# Patient Record
Sex: MALE | Race: WHITE | NOT HISPANIC OR LATINO | URBAN - METROPOLITAN AREA
[De-identification: names, ages, dates, MRNs, and addresses within clinical notes are randomized per-mention and may not be internally consistent; named-entity substitution may affect disease eponyms.]

---

## 2019-10-28 VITALS
OXYGEN SATURATION: 90 % | HEART RATE: 67 BPM | HEART RATE: 74 BPM | OXYGEN SATURATION: 81 % | OXYGEN SATURATION: 89 % | HEIGHT: 67 IN | OXYGEN SATURATION: 94 % | HEART RATE: 64 BPM | RESPIRATION RATE: 14 BRPM | RESPIRATION RATE: 15 BRPM | OXYGEN SATURATION: 96 % | RESPIRATION RATE: 6 BRPM | DIASTOLIC BLOOD PRESSURE: 73 MMHG | DIASTOLIC BLOOD PRESSURE: 86 MMHG | SYSTOLIC BLOOD PRESSURE: 116 MMHG | RESPIRATION RATE: 17 BRPM | HEART RATE: 79 BPM | OXYGEN SATURATION: 88 % | SYSTOLIC BLOOD PRESSURE: 140 MMHG | SYSTOLIC BLOOD PRESSURE: 133 MMHG | TEMPERATURE: 97 F | TEMPERATURE: 96.5 F | RESPIRATION RATE: 13 BRPM | HEART RATE: 75 BPM | SYSTOLIC BLOOD PRESSURE: 139 MMHG | OXYGEN SATURATION: 97 % | DIASTOLIC BLOOD PRESSURE: 70 MMHG | WEIGHT: 240 LBS | DIASTOLIC BLOOD PRESSURE: 67 MMHG | SYSTOLIC BLOOD PRESSURE: 115 MMHG | RESPIRATION RATE: 11 BRPM | RESPIRATION RATE: 18 BRPM | HEART RATE: 73 BPM | HEART RATE: 81 BPM | DIASTOLIC BLOOD PRESSURE: 90 MMHG | RESPIRATION RATE: 12 BRPM | SYSTOLIC BLOOD PRESSURE: 129 MMHG | SYSTOLIC BLOOD PRESSURE: 124 MMHG | DIASTOLIC BLOOD PRESSURE: 65 MMHG | SYSTOLIC BLOOD PRESSURE: 136 MMHG | HEART RATE: 78 BPM | SYSTOLIC BLOOD PRESSURE: 123 MMHG | DIASTOLIC BLOOD PRESSURE: 59 MMHG | OXYGEN SATURATION: 87 % | SYSTOLIC BLOOD PRESSURE: 155 MMHG | OXYGEN SATURATION: 95 % | HEART RATE: 77 BPM

## 2019-10-30 ENCOUNTER — AMBULATORY SURGICAL CENTER (AMBULATORY)
Dept: URBAN - METROPOLITAN AREA SURGERY 17 | Facility: SURGERY | Age: 69
End: 2019-10-30
Payer: COMMERCIAL

## 2019-10-30 ENCOUNTER — OFFICE (AMBULATORY)
Dept: URBAN - METROPOLITAN AREA PATHOLOGY 4 | Facility: PATHOLOGY | Age: 69
End: 2019-10-30
Payer: COMMERCIAL

## 2019-10-30 DIAGNOSIS — K57.30 DIVERTICULOSIS OF LARGE INTESTINE WITHOUT PERFORATION OR ABS: ICD-10-CM

## 2019-10-30 DIAGNOSIS — D12.2 BENIGN NEOPLASM OF ASCENDING COLON: ICD-10-CM

## 2019-10-30 DIAGNOSIS — Z86.010 PERSONAL HISTORY OF COLONIC POLYPS: ICD-10-CM

## 2019-10-30 DIAGNOSIS — D12.3 BENIGN NEOPLASM OF TRANSVERSE COLON: ICD-10-CM

## 2019-10-30 DIAGNOSIS — K62.1 RECTAL POLYP: ICD-10-CM

## 2019-10-30 LAB
GI HISTOLOGY: A. UNSPECIFIED: (no result)
GI HISTOLOGY: B. UNSPECIFIED: (no result)
GI HISTOLOGY: C. UNSPECIFIED: (no result)
GI HISTOLOGY: PDF REPORT: (no result)

## 2019-10-30 PROCEDURE — 88305 TISSUE EXAM BY PATHOLOGIST: CPT | Mod: 33 | Performed by: INTERNAL MEDICINE

## 2019-10-30 PROCEDURE — 45385 COLONOSCOPY W/LESION REMOVAL: CPT | Mod: 33 | Performed by: INTERNAL MEDICINE

## 2019-10-30 NOTE — SERVICEHPINOTES
pleasant 69-year-old gentleman who has a personal history of polyps.  He is asymptomatic at this time from a GI standpoint.  Family history is negative for polyps or colon cancer.  He presents for a follow-up colonoscopy.

## 2025-03-31 VITALS
SYSTOLIC BLOOD PRESSURE: 104 MMHG | RESPIRATION RATE: 22 BRPM | RESPIRATION RATE: 14 BRPM | OXYGEN SATURATION: 93 % | HEART RATE: 79 BPM | SYSTOLIC BLOOD PRESSURE: 169 MMHG | DIASTOLIC BLOOD PRESSURE: 106 MMHG | OXYGEN SATURATION: 91 % | HEART RATE: 84 BPM | OXYGEN SATURATION: 96 % | TEMPERATURE: 97.4 F | SYSTOLIC BLOOD PRESSURE: 122 MMHG | SYSTOLIC BLOOD PRESSURE: 117 MMHG | HEART RATE: 75 BPM | DIASTOLIC BLOOD PRESSURE: 76 MMHG | HEART RATE: 70 BPM | SYSTOLIC BLOOD PRESSURE: 136 MMHG | WEIGHT: 220 LBS | HEART RATE: 72 BPM | RESPIRATION RATE: 12 BRPM | DIASTOLIC BLOOD PRESSURE: 77 MMHG | HEART RATE: 69 BPM | SYSTOLIC BLOOD PRESSURE: 144 MMHG | SYSTOLIC BLOOD PRESSURE: 124 MMHG | DIASTOLIC BLOOD PRESSURE: 70 MMHG | OXYGEN SATURATION: 99 % | RESPIRATION RATE: 18 BRPM | DIASTOLIC BLOOD PRESSURE: 86 MMHG | HEART RATE: 71 BPM | DIASTOLIC BLOOD PRESSURE: 89 MMHG | OXYGEN SATURATION: 95 % | HEART RATE: 67 BPM | SYSTOLIC BLOOD PRESSURE: 139 MMHG | TEMPERATURE: 96.4 F | OXYGEN SATURATION: 98 % | RESPIRATION RATE: 21 BRPM | RESPIRATION RATE: 16 BRPM | DIASTOLIC BLOOD PRESSURE: 69 MMHG | RESPIRATION RATE: 11 BRPM | SYSTOLIC BLOOD PRESSURE: 171 MMHG | SYSTOLIC BLOOD PRESSURE: 126 MMHG | HEIGHT: 67 IN

## 2025-04-03 ENCOUNTER — OFFICE (AMBULATORY)
Dept: URBAN - METROPOLITAN AREA PATHOLOGY 4 | Facility: PATHOLOGY | Age: 75
End: 2025-04-03
Payer: COMMERCIAL

## 2025-04-03 ENCOUNTER — AMBULATORY SURGICAL CENTER (AMBULATORY)
Dept: URBAN - METROPOLITAN AREA SURGERY 17 | Facility: SURGERY | Age: 75
End: 2025-04-03
Payer: COMMERCIAL

## 2025-04-03 DIAGNOSIS — K63.5 POLYP OF COLON: ICD-10-CM

## 2025-04-03 DIAGNOSIS — D12.4 BENIGN NEOPLASM OF DESCENDING COLON: ICD-10-CM

## 2025-04-03 DIAGNOSIS — Z86.0101 PERSONAL HISTORY OF ADENOMATOUS AND SERRATED COLON POLYPS: ICD-10-CM

## 2025-04-03 DIAGNOSIS — Z09 ENCOUNTER FOR FOLLOW-UP EXAMINATION AFTER COMPLETED TREATMEN: ICD-10-CM

## 2025-04-03 DIAGNOSIS — K57.30 DIVERTICULOSIS OF LARGE INTESTINE WITHOUT PERFORATION OR ABS: ICD-10-CM

## 2025-04-03 LAB
GI HISTOLOGY: A. TRANSVERSE COLON: (no result)
GI HISTOLOGY: B. DESCENDING COLON: (no result)
GI HISTOLOGY: PDF REPORT: (no result)

## 2025-04-03 PROCEDURE — 45385 COLONOSCOPY W/LESION REMOVAL: CPT | Mod: 33 | Performed by: INTERNAL MEDICINE

## 2025-04-03 PROCEDURE — 88305 TISSUE EXAM BY PATHOLOGIST: CPT | Performed by: PATHOLOGY

## 2025-04-03 NOTE — SERVICEHPINOTES
pleasant 75-year-old gentleman who has a personal history of polyps.  He is asymptomatic at this time from a GI standpoint.  Family history is negative for polyps or colon cancer.  He presents for a follow-up colonoscopy.

## 2025-07-14 ENCOUNTER — APPOINTMENT (OUTPATIENT)
Dept: CT IMAGING | Facility: HOSPITAL | Age: 75
End: 2025-07-14
Payer: COMMERCIAL

## 2025-07-14 ENCOUNTER — HOSPITAL ENCOUNTER (INPATIENT)
Facility: HOSPITAL | Age: 75
LOS: 2 days | Discharge: HOME | End: 2025-07-16
Attending: EMERGENCY MEDICINE | Admitting: STUDENT IN AN ORGANIZED HEALTH CARE EDUCATION/TRAINING PROGRAM
Payer: COMMERCIAL

## 2025-07-14 ENCOUNTER — APPOINTMENT (OUTPATIENT)
Dept: GENERAL RADIOLOGY | Facility: HOSPITAL | Age: 75
End: 2025-07-14
Payer: COMMERCIAL

## 2025-07-14 DIAGNOSIS — R20.0 RIGHT SIDED NUMBNESS: ICD-10-CM

## 2025-07-14 DIAGNOSIS — I77.810 AORTIC ROOT DILATION: ICD-10-CM

## 2025-07-14 DIAGNOSIS — R29.90 STROKE-LIKE SYMPTOM: Primary | ICD-10-CM

## 2025-07-14 DIAGNOSIS — I48.0 PAROXYSMAL ATRIAL FIBRILLATION: ICD-10-CM

## 2025-07-14 DIAGNOSIS — I16.9 HYPERTENSIVE CRISIS: ICD-10-CM

## 2025-07-14 PROBLEM — I10 HYPERTENSION: Status: ACTIVE | Noted: 2025-07-14

## 2025-07-14 PROBLEM — E78.00 HYPERCHOLESTEROLEMIA: Status: ACTIVE | Noted: 2025-07-14

## 2025-07-14 LAB
ABO GROUP BLD: NORMAL
ALBUMIN SERPL-MCNC: 4.2 G/DL (ref 3.5–5.2)
ALBUMIN/GLOB SERPL: 1.4 G/DL
ALP SERPL-CCNC: 100 U/L (ref 39–117)
ALT SERPL W P-5'-P-CCNC: 23 U/L (ref 1–41)
ANION GAP SERPL CALCULATED.3IONS-SCNC: 11.8 MMOL/L (ref 5–15)
APTT PPP: 26.3 SECONDS (ref 22.7–35.4)
AST SERPL-CCNC: 21 U/L (ref 1–40)
BASOPHILS # BLD AUTO: 0.02 10*3/MM3 (ref 0–0.2)
BASOPHILS NFR BLD AUTO: 0.3 % (ref 0–1.5)
BILIRUB SERPL-MCNC: 0.3 MG/DL (ref 0–1.2)
BLD GP AB SCN SERPL QL: NEGATIVE
BUN SERPL-MCNC: 10 MG/DL (ref 8–23)
BUN/CREAT SERPL: 12.5 (ref 7–25)
CALCIUM SPEC-SCNC: 9.5 MG/DL (ref 8.6–10.5)
CHLORIDE SERPL-SCNC: 101 MMOL/L (ref 98–107)
CO2 SERPL-SCNC: 20.2 MMOL/L (ref 22–29)
CREAT SERPL-MCNC: 0.8 MG/DL (ref 0.76–1.27)
DEPRECATED RDW RBC AUTO: 46.7 FL (ref 37–54)
EGFRCR SERPLBLD CKD-EPI 2021: 92.3 ML/MIN/1.73
EOSINOPHIL # BLD AUTO: 0.11 10*3/MM3 (ref 0–0.4)
EOSINOPHIL NFR BLD AUTO: 1.4 % (ref 0.3–6.2)
ERYTHROCYTE [DISTWIDTH] IN BLOOD BY AUTOMATED COUNT: 13.2 % (ref 12.3–15.4)
GLOBULIN UR ELPH-MCNC: 3.1 GM/DL
GLUCOSE SERPL-MCNC: 106 MG/DL (ref 65–99)
HCT VFR BLD AUTO: 50.4 % (ref 37.5–51)
HGB BLD-MCNC: 15.8 G/DL (ref 13–17.7)
HOLD SPECIMEN: NORMAL
HOLD SPECIMEN: NORMAL
IMM GRANULOCYTES # BLD AUTO: 0.05 10*3/MM3 (ref 0–0.05)
IMM GRANULOCYTES NFR BLD AUTO: 0.6 % (ref 0–0.5)
INR PPP: 1.11 (ref 0.9–1.1)
LYMPHOCYTES # BLD AUTO: 2.11 10*3/MM3 (ref 0.7–3.1)
LYMPHOCYTES NFR BLD AUTO: 27.3 % (ref 19.6–45.3)
MCH RBC QN AUTO: 30 PG (ref 26.6–33)
MCHC RBC AUTO-ENTMCNC: 31.3 G/DL (ref 31.5–35.7)
MCV RBC AUTO: 95.8 FL (ref 79–97)
MONOCYTES # BLD AUTO: 0.5 10*3/MM3 (ref 0.1–0.9)
MONOCYTES NFR BLD AUTO: 6.5 % (ref 5–12)
NEUTROPHILS NFR BLD AUTO: 4.94 10*3/MM3 (ref 1.7–7)
NEUTROPHILS NFR BLD AUTO: 63.9 % (ref 42.7–76)
NRBC BLD AUTO-RTO: 0 /100 WBC (ref 0–0.2)
PLATELET # BLD AUTO: 233 10*3/MM3 (ref 140–450)
PMV BLD AUTO: 9.9 FL (ref 6–12)
POTASSIUM SERPL-SCNC: 3.3 MMOL/L (ref 3.5–5.2)
PROT SERPL-MCNC: 7.3 G/DL (ref 6–8.5)
PROTHROMBIN TIME: 14.2 SECONDS (ref 11.7–14.2)
RBC # BLD AUTO: 5.26 10*6/MM3 (ref 4.14–5.8)
RH BLD: POSITIVE
SODIUM SERPL-SCNC: 133 MMOL/L (ref 136–145)
T&S EXPIRATION DATE: NORMAL
WBC NRBC COR # BLD AUTO: 7.73 10*3/MM3 (ref 3.4–10.8)
WHOLE BLOOD HOLD COAG: NORMAL
WHOLE BLOOD HOLD SPECIMEN: NORMAL

## 2025-07-14 PROCEDURE — 80053 COMPREHEN METABOLIC PANEL: CPT | Performed by: PHYSICIAN ASSISTANT

## 2025-07-14 PROCEDURE — 25510000001 IOPAMIDOL PER 1 ML: Performed by: EMERGENCY MEDICINE

## 2025-07-14 PROCEDURE — 99285 EMERGENCY DEPT VISIT HI MDM: CPT

## 2025-07-14 PROCEDURE — 93005 ELECTROCARDIOGRAM TRACING: CPT | Performed by: PHYSICIAN ASSISTANT

## 2025-07-14 PROCEDURE — 25010000002 LABETALOL 5 MG/ML SOLUTION: Performed by: PHYSICIAN ASSISTANT

## 2025-07-14 PROCEDURE — 70498 CT ANGIOGRAPHY NECK: CPT

## 2025-07-14 PROCEDURE — 85610 PROTHROMBIN TIME: CPT | Performed by: PHYSICIAN ASSISTANT

## 2025-07-14 PROCEDURE — 86850 RBC ANTIBODY SCREEN: CPT | Performed by: PHYSICIAN ASSISTANT

## 2025-07-14 PROCEDURE — 93010 ELECTROCARDIOGRAM REPORT: CPT | Performed by: INTERNAL MEDICINE

## 2025-07-14 PROCEDURE — 86900 BLOOD TYPING SEROLOGIC ABO: CPT | Performed by: PHYSICIAN ASSISTANT

## 2025-07-14 PROCEDURE — 85730 THROMBOPLASTIN TIME PARTIAL: CPT | Performed by: PHYSICIAN ASSISTANT

## 2025-07-14 PROCEDURE — 71045 X-RAY EXAM CHEST 1 VIEW: CPT

## 2025-07-14 PROCEDURE — 25010000002 LABETALOL 5 MG/ML SOLUTION: Performed by: EMERGENCY MEDICINE

## 2025-07-14 PROCEDURE — 70496 CT ANGIOGRAPHY HEAD: CPT

## 2025-07-14 PROCEDURE — 86901 BLOOD TYPING SEROLOGIC RH(D): CPT | Performed by: PHYSICIAN ASSISTANT

## 2025-07-14 PROCEDURE — 85025 COMPLETE CBC W/AUTO DIFF WBC: CPT | Performed by: PHYSICIAN ASSISTANT

## 2025-07-14 RX ORDER — ONDANSETRON 4 MG/1
4 TABLET, ORALLY DISINTEGRATING ORAL EVERY 6 HOURS PRN
Status: DISCONTINUED | OUTPATIENT
Start: 2025-07-14 | End: 2025-07-16 | Stop reason: HOSPADM

## 2025-07-14 RX ORDER — ONDANSETRON 2 MG/ML
4 INJECTION INTRAMUSCULAR; INTRAVENOUS EVERY 6 HOURS PRN
Status: DISCONTINUED | OUTPATIENT
Start: 2025-07-14 | End: 2025-07-16 | Stop reason: HOSPADM

## 2025-07-14 RX ORDER — LABETALOL HYDROCHLORIDE 5 MG/ML
10 INJECTION, SOLUTION INTRAVENOUS ONCE
Status: COMPLETED | OUTPATIENT
Start: 2025-07-14 | End: 2025-07-14

## 2025-07-14 RX ORDER — ASPIRIN 300 MG/1
300 SUPPOSITORY RECTAL DAILY
Status: DISCONTINUED | OUTPATIENT
Start: 2025-07-15 | End: 2025-07-15

## 2025-07-14 RX ORDER — IOPAMIDOL 755 MG/ML
100 INJECTION, SOLUTION INTRAVASCULAR
Status: COMPLETED | OUTPATIENT
Start: 2025-07-14 | End: 2025-07-14

## 2025-07-14 RX ORDER — LABETALOL HYDROCHLORIDE 5 MG/ML
40 INJECTION, SOLUTION INTRAVENOUS ONCE
Status: DISCONTINUED | OUTPATIENT
Start: 2025-07-14 | End: 2025-07-15

## 2025-07-14 RX ORDER — ACETAMINOPHEN 650 MG/1
650 SUPPOSITORY RECTAL EVERY 4 HOURS PRN
Status: DISCONTINUED | OUTPATIENT
Start: 2025-07-14 | End: 2025-07-16 | Stop reason: HOSPADM

## 2025-07-14 RX ORDER — SODIUM CHLORIDE 0.9 % (FLUSH) 0.9 %
10 SYRINGE (ML) INJECTION AS NEEDED
Status: DISCONTINUED | OUTPATIENT
Start: 2025-07-14 | End: 2025-07-16 | Stop reason: HOSPADM

## 2025-07-14 RX ORDER — SODIUM CHLORIDE 9 MG/ML
75 INJECTION, SOLUTION INTRAVENOUS CONTINUOUS
Status: DISCONTINUED | OUTPATIENT
Start: 2025-07-14 | End: 2025-07-15

## 2025-07-14 RX ORDER — POLYETHYLENE GLYCOL 3350 17 G/17G
17 POWDER, FOR SOLUTION ORAL DAILY PRN
Status: DISCONTINUED | OUTPATIENT
Start: 2025-07-14 | End: 2025-07-16 | Stop reason: HOSPADM

## 2025-07-14 RX ORDER — ASPIRIN 325 MG
325 TABLET ORAL DAILY
Status: DISCONTINUED | OUTPATIENT
Start: 2025-07-15 | End: 2025-07-15

## 2025-07-14 RX ORDER — BISACODYL 10 MG
10 SUPPOSITORY, RECTAL RECTAL DAILY PRN
Status: DISCONTINUED | OUTPATIENT
Start: 2025-07-14 | End: 2025-07-16 | Stop reason: HOSPADM

## 2025-07-14 RX ORDER — LABETALOL HYDROCHLORIDE 5 MG/ML
10 INJECTION, SOLUTION INTRAVENOUS
Status: DISCONTINUED | OUTPATIENT
Start: 2025-07-14 | End: 2025-07-15

## 2025-07-14 RX ORDER — LABETALOL HYDROCHLORIDE 5 MG/ML
20 INJECTION, SOLUTION INTRAVENOUS ONCE
Status: COMPLETED | OUTPATIENT
Start: 2025-07-14 | End: 2025-07-14

## 2025-07-14 RX ORDER — ATORVASTATIN CALCIUM 80 MG/1
80 TABLET, FILM COATED ORAL NIGHTLY
Status: DISCONTINUED | OUTPATIENT
Start: 2025-07-14 | End: 2025-07-16 | Stop reason: HOSPADM

## 2025-07-14 RX ORDER — NITROGLYCERIN 0.4 MG/1
0.4 TABLET SUBLINGUAL
Status: DISCONTINUED | OUTPATIENT
Start: 2025-07-14 | End: 2025-07-16 | Stop reason: HOSPADM

## 2025-07-14 RX ORDER — AMOXICILLIN 250 MG
2 CAPSULE ORAL 2 TIMES DAILY PRN
Status: DISCONTINUED | OUTPATIENT
Start: 2025-07-14 | End: 2025-07-16 | Stop reason: HOSPADM

## 2025-07-14 RX ORDER — BISACODYL 5 MG/1
5 TABLET, DELAYED RELEASE ORAL DAILY PRN
Status: DISCONTINUED | OUTPATIENT
Start: 2025-07-14 | End: 2025-07-16 | Stop reason: HOSPADM

## 2025-07-14 RX ORDER — ACETAMINOPHEN 325 MG/1
650 TABLET ORAL EVERY 4 HOURS PRN
Status: DISCONTINUED | OUTPATIENT
Start: 2025-07-14 | End: 2025-07-16 | Stop reason: HOSPADM

## 2025-07-14 RX ADMIN — LABETALOL HYDROCHLORIDE 20 MG: 5 INJECTION, SOLUTION INTRAVENOUS at 22:14

## 2025-07-14 RX ADMIN — LABETALOL HYDROCHLORIDE 10 MG: 5 INJECTION, SOLUTION INTRAVENOUS at 21:06

## 2025-07-14 RX ADMIN — IOPAMIDOL 95 ML: 755 INJECTION, SOLUTION INTRAVENOUS at 20:47

## 2025-07-15 ENCOUNTER — APPOINTMENT (OUTPATIENT)
Dept: CARDIOLOGY | Facility: HOSPITAL | Age: 75
End: 2025-07-15
Payer: COMMERCIAL

## 2025-07-15 ENCOUNTER — APPOINTMENT (OUTPATIENT)
Dept: MRI IMAGING | Facility: HOSPITAL | Age: 75
End: 2025-07-15
Payer: COMMERCIAL

## 2025-07-15 PROBLEM — E87.6 HYPOKALEMIA: Status: ACTIVE | Noted: 2025-07-15

## 2025-07-15 LAB
ANION GAP SERPL CALCULATED.3IONS-SCNC: 12.7 MMOL/L (ref 5–15)
BUN SERPL-MCNC: 8 MG/DL (ref 8–23)
BUN/CREAT SERPL: 12.9 (ref 7–25)
CALCIUM SPEC-SCNC: 9.5 MG/DL (ref 8.6–10.5)
CHLORIDE SERPL-SCNC: 106 MMOL/L (ref 98–107)
CHOLEST SERPL-MCNC: 154 MG/DL (ref 0–200)
CO2 SERPL-SCNC: 18.3 MMOL/L (ref 22–29)
CREAT SERPL-MCNC: 0.62 MG/DL (ref 0.76–1.27)
DEPRECATED RDW RBC AUTO: 42.1 FL (ref 37–54)
EGFRCR SERPLBLD CKD-EPI 2021: 99.7 ML/MIN/1.73
ERYTHROCYTE [DISTWIDTH] IN BLOOD BY AUTOMATED COUNT: 12.4 % (ref 12.3–15.4)
GLUCOSE BLDC GLUCOMTR-MCNC: 101 MG/DL (ref 70–130)
GLUCOSE BLDC GLUCOMTR-MCNC: 99 MG/DL (ref 70–130)
GLUCOSE SERPL-MCNC: 99 MG/DL (ref 65–99)
HBA1C MFR BLD: 5.2 % (ref 4.8–5.6)
HCT VFR BLD AUTO: 47.3 % (ref 37.5–51)
HDLC SERPL-MCNC: 50 MG/DL (ref 40–60)
HGB BLD-MCNC: 15.6 G/DL (ref 13–17.7)
LDLC SERPL CALC-MCNC: 81 MG/DL (ref 0–100)
LDLC/HDLC SERPL: 1.56 {RATIO}
MCH RBC QN AUTO: 30.5 PG (ref 26.6–33)
MCHC RBC AUTO-ENTMCNC: 33 G/DL (ref 31.5–35.7)
MCV RBC AUTO: 92.4 FL (ref 79–97)
PLATELET # BLD AUTO: 254 10*3/MM3 (ref 140–450)
PMV BLD AUTO: 10.2 FL (ref 6–12)
POTASSIUM SERPL-SCNC: 3.6 MMOL/L (ref 3.5–5.2)
QT INTERVAL: 412 MS
QTC INTERVAL: 459 MS
RBC # BLD AUTO: 5.12 10*6/MM3 (ref 4.14–5.8)
SODIUM SERPL-SCNC: 137 MMOL/L (ref 136–145)
TRIGL SERPL-MCNC: 130 MG/DL (ref 0–150)
TSH SERPL DL<=0.05 MIU/L-ACNC: 2.06 UIU/ML (ref 0.27–4.2)
VLDLC SERPL-MCNC: 23 MG/DL (ref 5–40)
WBC NRBC COR # BLD AUTO: 9.33 10*3/MM3 (ref 3.4–10.8)

## 2025-07-15 PROCEDURE — 70551 MRI BRAIN STEM W/O DYE: CPT

## 2025-07-15 PROCEDURE — 25010000002 LABETALOL 5 MG/ML SOLUTION: Performed by: STUDENT IN AN ORGANIZED HEALTH CARE EDUCATION/TRAINING PROGRAM

## 2025-07-15 PROCEDURE — 84443 ASSAY THYROID STIM HORMONE: CPT

## 2025-07-15 PROCEDURE — 93306 TTE W/DOPPLER COMPLETE: CPT | Performed by: INTERNAL MEDICINE

## 2025-07-15 PROCEDURE — 82948 REAGENT STRIP/BLOOD GLUCOSE: CPT

## 2025-07-15 PROCEDURE — 97161 PT EVAL LOW COMPLEX 20 MIN: CPT

## 2025-07-15 PROCEDURE — 97165 OT EVAL LOW COMPLEX 30 MIN: CPT

## 2025-07-15 PROCEDURE — 85027 COMPLETE CBC AUTOMATED: CPT

## 2025-07-15 PROCEDURE — 99222 1ST HOSP IP/OBS MODERATE 55: CPT | Performed by: STUDENT IN AN ORGANIZED HEALTH CARE EDUCATION/TRAINING PROGRAM

## 2025-07-15 PROCEDURE — 80061 LIPID PANEL: CPT

## 2025-07-15 PROCEDURE — 92523 SPEECH SOUND LANG COMPREHEN: CPT

## 2025-07-15 PROCEDURE — 80048 BASIC METABOLIC PNL TOTAL CA: CPT

## 2025-07-15 PROCEDURE — 83036 HEMOGLOBIN GLYCOSYLATED A1C: CPT

## 2025-07-15 PROCEDURE — 25810000003 SODIUM CHLORIDE 0.9 % SOLUTION

## 2025-07-15 PROCEDURE — 93306 TTE W/DOPPLER COMPLETE: CPT

## 2025-07-15 PROCEDURE — 36415 COLL VENOUS BLD VENIPUNCTURE: CPT

## 2025-07-15 PROCEDURE — 97116 GAIT TRAINING THERAPY: CPT

## 2025-07-15 PROCEDURE — 25510000001 PERFLUTREN 6.52 MG/ML SUSPENSION 2 ML VIAL

## 2025-07-15 RX ORDER — AMLODIPINE BESYLATE 2.5 MG/1
2.5 TABLET ORAL DAILY
Status: DISCONTINUED | OUTPATIENT
Start: 2025-07-15 | End: 2025-07-16

## 2025-07-15 RX ORDER — ALLOPURINOL 100 MG/1
100 TABLET ORAL DAILY
Status: DISCONTINUED | OUTPATIENT
Start: 2025-07-15 | End: 2025-07-16 | Stop reason: HOSPADM

## 2025-07-15 RX ORDER — LABETALOL HYDROCHLORIDE 5 MG/ML
10 INJECTION, SOLUTION INTRAVENOUS EVERY 6 HOURS PRN
Status: DISCONTINUED | OUTPATIENT
Start: 2025-07-15 | End: 2025-07-16 | Stop reason: HOSPADM

## 2025-07-15 RX ORDER — CLOPIDOGREL BISULFATE 75 MG/1
75 TABLET ORAL DAILY
Status: DISCONTINUED | OUTPATIENT
Start: 2025-07-16 | End: 2025-07-16

## 2025-07-15 RX ORDER — CLOPIDOGREL BISULFATE 75 MG/1
300 TABLET ORAL ONCE
Status: COMPLETED | OUTPATIENT
Start: 2025-07-15 | End: 2025-07-15

## 2025-07-15 RX ORDER — LISINOPRIL 20 MG/1
20 TABLET ORAL DAILY
COMMUNITY

## 2025-07-15 RX ORDER — ATORVASTATIN CALCIUM 10 MG/1
10 TABLET, FILM COATED ORAL NIGHTLY
COMMUNITY
End: 2025-07-16 | Stop reason: HOSPADM

## 2025-07-15 RX ORDER — ASPIRIN 81 MG/1
81 TABLET, CHEWABLE ORAL DAILY
Status: DISCONTINUED | OUTPATIENT
Start: 2025-07-16 | End: 2025-07-16 | Stop reason: HOSPADM

## 2025-07-15 RX ORDER — ALLOPURINOL 100 MG/1
100 TABLET ORAL DAILY
COMMUNITY

## 2025-07-15 RX ORDER — LISINOPRIL 20 MG/1
20 TABLET ORAL DAILY
Status: DISCONTINUED | OUTPATIENT
Start: 2025-07-15 | End: 2025-07-15

## 2025-07-15 RX ORDER — LISINOPRIL 20 MG/1
20 TABLET ORAL DAILY
Status: DISCONTINUED | OUTPATIENT
Start: 2025-07-15 | End: 2025-07-16 | Stop reason: HOSPADM

## 2025-07-15 RX ORDER — LABETALOL HYDROCHLORIDE 5 MG/ML
10 INJECTION, SOLUTION INTRAVENOUS
Status: DISCONTINUED | OUTPATIENT
Start: 2025-07-15 | End: 2025-07-15

## 2025-07-15 RX ADMIN — ATORVASTATIN CALCIUM 80 MG: 80 TABLET, FILM COATED ORAL at 00:50

## 2025-07-15 RX ADMIN — ATORVASTATIN CALCIUM 80 MG: 80 TABLET, FILM COATED ORAL at 20:45

## 2025-07-15 RX ADMIN — ASPIRIN 325 MG ORAL TABLET 325 MG: 325 PILL ORAL at 08:50

## 2025-07-15 RX ADMIN — LABETALOL HYDROCHLORIDE 10 MG: 5 INJECTION, SOLUTION INTRAVENOUS at 18:40

## 2025-07-15 RX ADMIN — ALLOPURINOL 100 MG: 100 TABLET ORAL at 08:50

## 2025-07-15 RX ADMIN — AMLODIPINE BESYLATE 2.5 MG: 2.5 TABLET ORAL at 20:45

## 2025-07-15 RX ADMIN — SODIUM CHLORIDE 75 ML/HR: 9 INJECTION, SOLUTION INTRAVENOUS at 00:50

## 2025-07-15 RX ADMIN — CLOPIDOGREL BISULFATE 300 MG: 75 TABLET ORAL at 08:50

## 2025-07-15 RX ADMIN — PERFLUTREN 2 ML: 6.52 INJECTION, SUSPENSION INTRAVENOUS at 13:30

## 2025-07-15 RX ADMIN — LISINOPRIL 20 MG: 20 TABLET ORAL at 15:14

## 2025-07-15 RX ADMIN — LABETALOL HYDROCHLORIDE 10 MG: 5 INJECTION, SOLUTION INTRAVENOUS at 20:48

## 2025-07-15 NOTE — PLAN OF CARE
Problem: Adult Inpatient Plan of Care  Goal: Plan of Care Review  7/15/2025 1835 by Jose Alejandro Rocha RN  Outcome: Progressing  Flowsheets (Taken 7/15/2025 1835)  Progress: improving  Plan of Care Reviewed With: patient  7/15/2025 1834 by Jose Alejandro Rocha RN  Outcome: Progressing  Flowsheets (Taken 7/15/2025 1834)  Progress: improving  Plan of Care Reviewed With: patient  Goal: Patient-Specific Goal (Individualized)  7/15/2025 1835 by Jose Alejandro Rocha RN  Outcome: Progressing  7/15/2025 1834 by Jose Alejandro Rocha RN  Outcome: Progressing  Goal: Absence of Hospital-Acquired Illness or Injury  7/15/2025 1835 by Jose Alejandro Rocha RN  Outcome: Progressing  7/15/2025 1834 by Jose Alejandro Rocha RN  Outcome: Progressing  Intervention: Identify and Manage Fall Risk  Recent Flowsheet Documentation  Taken 7/15/2025 1600 by Jose Alejandro Rocha RN  Safety Promotion/Fall Prevention:   safety round/check completed   assistive device/personal items within reach  Taken 7/15/2025 1400 by Jose Alejandro Rocha RN  Safety Promotion/Fall Prevention: patient off unit  Taken 7/15/2025 1200 by Jose Alejandro Rocha RN  Safety Promotion/Fall Prevention:   safety round/check completed   assistive device/personal items within reach  Taken 7/15/2025 0801 by Jose Alejandro Rocha RN  Safety Promotion/Fall Prevention:   safety round/check completed   assistive device/personal items within reach  Intervention: Prevent Infection  Recent Flowsheet Documentation  Taken 7/15/2025 1600 by Jose Alejandro Rocha RN  Infection Prevention:   personal protective equipment utilized   environmental surveillance performed  Taken 7/15/2025 1200 by Jose Alejandro Rocha RN  Infection Prevention:   personal protective equipment utilized   environmental surveillance performed  Taken 7/15/2025 0801 by Jose Alejandro Rocha RN  Infection Prevention:   personal protective equipment utilized   environmental surveillance performed  Goal:  Optimal Comfort and Wellbeing  7/15/2025 1835 by Jose Alejandro Rocha RN  Outcome: Progressing  7/15/2025 1834 by Jose Alejandro Rocha RN  Outcome: Progressing  Goal: Readiness for Transition of Care  7/15/2025 1835 by Jose Alejandro Rocha RN  Outcome: Progressing  7/15/2025 1834 by Jose Alejandro Rocha RN  Outcome: Progressing   Goal Outcome Evaluation:  Plan of Care Reviewed With: patient        Progress: improving

## 2025-07-15 NOTE — ED PROVIDER NOTES
EMERGENCY DEPARTMENT ENCOUNTER      Room Number:  04/04  PCP: Daniel Billy MD  Independent Historians: Historian: Patient  Patient Care Team:  Daniel Billy MD as PCP - General (Family Medicine)       HPI:  Chief Complaint: Right sided numbness    A complete HPI/ROS/PMH/PSH/SH/FH are unobtainable due to: EM_Unobtainable History : None    Chronic or social conditions impacting patient care (Social Determinants of Health): None      Context: Tushar Herron is a 75 y.o. male with a PMH significant for hypertension, hyperlipidemia, vitamin D deficiency who presents to the ED c/o acute right-sided numbness.  The patient reports that at 7:10 PM he was in bed and started noticing that his right arm was falling asleep.  He progressed with numbness of the right arm as well as development of right-sided facial tingling and right leg tingling.  It was at this time that he called EMS for transport.  EMS arrived to find the patient with the described symptoms as well as some weakness of the right foot with dorsiflexion and weakness of the right hand.  Throughout transport the patient did have repetitive questioning but arrives with significant improvement of symptoms.  He is now at his baseline with no symptoms currently.  No history of similar symptoms.  No blood thinner use.  No chest pain or shortness of breath today.      Upon review of prior external notes (non-ED) -and- Review of prior external test results outside of this encounter it appears the patient was evaluated in the office with PCP for gout on 3/25/2024.  The patient had a normal troponin and BNP on 7/22/2024.      PAST MEDICAL HISTORY  Active Ambulatory Problems     Diagnosis Date Noted    No Active Ambulatory Problems     Resolved Ambulatory Problems     Diagnosis Date Noted    No Resolved Ambulatory Problems     No Additional Past Medical History         PAST SURGICAL HISTORY  No past surgical history on file.      FAMILY HISTORY  No family history on  file.      SOCIAL HISTORY  Social History     Socioeconomic History    Marital status: Single         ALLERGIES  Penicillins      REVIEW OF SYSTEMS  Included in HPI  All systems reviewed and negative except for those discussed in HPI.      PHYSICAL EXAM    I have reviewed the triage vital signs and nursing notes.    ED Triage Vitals [07/14/25 2023]   Temp Heart Rate Resp BP SpO2   -- 78 19 (!) 217/106 96 %      Temp src Heart Rate Source Patient Position BP Location FiO2 (%)   -- Monitor -- -- --       Physical Exam  Constitutional:       General: He is not in acute distress.     Appearance: He is well-developed.   HENT:      Head: Normocephalic and atraumatic.   Eyes:      General: No scleral icterus.     Conjunctiva/sclera: Conjunctivae normal.   Neck:      Trachea: No tracheal deviation.   Cardiovascular:      Rate and Rhythm: Normal rate and regular rhythm.   Pulmonary:      Effort: Pulmonary effort is normal.      Breath sounds: Normal breath sounds.   Abdominal:      Palpations: Abdomen is soft.      Tenderness: There is no abdominal tenderness. There is no guarding.   Musculoskeletal:         General: No deformity.      Cervical back: Normal range of motion.   Lymphadenopathy:      Cervical: No cervical adenopathy.   Skin:     General: Skin is warm and dry.   Neurological:      Mental Status: He is alert and oriented to person, place, and time.      Comments: Cranial nerves II-XII are intact.  Sensation is intact and symmetric throughout, no deficit.  Motor function is 5/5 and symmetric in the extremities x 4.  There is no facial droop, aphasia, dysarthria, speech is clear and coherent.  Normal FTN.   Psychiatric:         Behavior: Behavior normal.         Vital signs and nursing notes reviewed.      PPE: I wore a surgical mask throughout my encounters with the pt. I performed hand hygiene on entry into the pt room and upon exit.     LAB RESULTS  Recent Results (from the past 24 hours)   ECG 12 Lead Stroke  Evaluation    Collection Time: 07/14/25  8:27 PM   Result Value Ref Range    QT Interval 412 ms    QTC Interval 459 ms   Comprehensive Metabolic Panel    Collection Time: 07/14/25  8:33 PM    Specimen: Blood   Result Value Ref Range    Glucose 106 (H) 65 - 99 mg/dL    BUN 10.0 8.0 - 23.0 mg/dL    Creatinine 0.80 0.76 - 1.27 mg/dL    Sodium 133 (L) 136 - 145 mmol/L    Potassium 3.3 (L) 3.5 - 5.2 mmol/L    Chloride 101 98 - 107 mmol/L    CO2 20.2 (L) 22.0 - 29.0 mmol/L    Calcium 9.5 8.6 - 10.5 mg/dL    Total Protein 7.3 6.0 - 8.5 g/dL    Albumin 4.2 3.5 - 5.2 g/dL    ALT (SGPT) 23 1 - 41 U/L    AST (SGOT) 21 1 - 40 U/L    Alkaline Phosphatase 100 39 - 117 U/L    Total Bilirubin 0.3 0.0 - 1.2 mg/dL    Globulin 3.1 gm/dL    A/G Ratio 1.4 g/dL    BUN/Creatinine Ratio 12.5 7.0 - 25.0    Anion Gap 11.8 5.0 - 15.0 mmol/L    eGFR 92.3 >60.0 mL/min/1.73   Protime-INR    Collection Time: 07/14/25  8:33 PM    Specimen: Blood   Result Value Ref Range    Protime 14.2 11.7 - 14.2 Seconds    INR 1.11 (H) 0.90 - 1.10   aPTT    Collection Time: 07/14/25  8:33 PM    Specimen: Blood   Result Value Ref Range    PTT 26.3 22.7 - 35.4 seconds   Type & Screen    Collection Time: 07/14/25  8:33 PM    Specimen: Blood   Result Value Ref Range    ABO Type A     RH type Positive     Antibody Screen Negative     T&S Expiration Date 7/17/2025 11:59:59 PM    Green Top (Gel)    Collection Time: 07/14/25  8:33 PM   Result Value Ref Range    Extra Tube Hold for add-ons.    Lavender Top    Collection Time: 07/14/25  8:33 PM   Result Value Ref Range    Extra Tube hold for add-on    Gold Top - SST    Collection Time: 07/14/25  8:33 PM   Result Value Ref Range    Extra Tube Hold for add-ons.    Light Blue Top    Collection Time: 07/14/25  8:33 PM   Result Value Ref Range    Extra Tube Hold for add-ons.    CBC Auto Differential    Collection Time: 07/14/25  8:33 PM    Specimen: Blood   Result Value Ref Range    WBC 7.73 3.40 - 10.80 10*3/mm3    RBC 5.26  4.14 - 5.80 10*6/mm3    Hemoglobin 15.8 13.0 - 17.7 g/dL    Hematocrit 50.4 37.5 - 51.0 %    MCV 95.8 79.0 - 97.0 fL    MCH 30.0 26.6 - 33.0 pg    MCHC 31.3 (L) 31.5 - 35.7 g/dL    RDW 13.2 12.3 - 15.4 %    RDW-SD 46.7 37.0 - 54.0 fl    MPV 9.9 6.0 - 12.0 fL    Platelets 233 140 - 450 10*3/mm3    Neutrophil % 63.9 42.7 - 76.0 %    Lymphocyte % 27.3 19.6 - 45.3 %    Monocyte % 6.5 5.0 - 12.0 %    Eosinophil % 1.4 0.3 - 6.2 %    Basophil % 0.3 0.0 - 1.5 %    Immature Grans % 0.6 (H) 0.0 - 0.5 %    Neutrophils, Absolute 4.94 1.70 - 7.00 10*3/mm3    Lymphocytes, Absolute 2.11 0.70 - 3.10 10*3/mm3    Monocytes, Absolute 0.50 0.10 - 0.90 10*3/mm3    Eosinophils, Absolute 0.11 0.00 - 0.40 10*3/mm3    Basophils, Absolute 0.02 0.00 - 0.20 10*3/mm3    Immature Grans, Absolute 0.05 0.00 - 0.05 10*3/mm3    nRBC 0.0 0.0 - 0.2 /100 WBC         RADIOLOGY  CT Angiogram Head w AI Analysis of LVO  CT Angiogram Head w AI Analysis of LVO, CT Angiogram Neck  Result Date: 7/14/2025  HEAD CT WITHOUT CONTRAST, HEAD & NECK CTA WITH CONTRAST  INDICATION: Right-sided weakness.  TECHNIQUE: Axial images were acquired from the skull base to vertex without contrast, including multiplanar reformats, per standard departmental protocol , followed by CT angiogram of the head and neck with IV contrast. 3-D postprocessing was performed and reviewed.  Evaluation for a significant carotid arterial stenosis is based on the NASCET criteria.  Radiation dose reduction techniques were utilized, including automated exposure control, and exposure modulation based on body size.  COMPARISON: None available.  FINDINGS:  Head CT: There is no CT evidence of acute intracranial hemorrhage, mass, or infarct. There is volume loss, but there is no evidence of hydrocephalus or extra-axial fluid collection.  There are nonspecific white matter changes, but there is no acute intracranial abnormality.  CTA neck: There is a normal aortic arch branching pattern without proximal  great vessel stenosis. Both vertebral arteries are patent throughout the neck, and supply the basilar artery.  Both common carotid arteries are normally patent. There is plaque at both cervical carotid bifurcations, with 0% stenosis in both internal carotid arteries.  CTA head:  There is symmetric distal intracranial runoff in the anterior, middle, and posterior cerebral artery territories.  There is no evidence of intracranial aneurysm, or of high-grade intracranial flow-limiting stenosis.  There is no evidence of branch vessel occlusion, or region or zone of hypoperfusion.  The dural venous sinuses appear normal.  Extravascular structures: There is no intracranial mass or abnormal enhancement.   The extracranial and cervical soft tissue structures show no acute abnormality.  The visualized lung apices show no acute abnormality.  There is no acute bony abnormality.       Negative unenhanced head CT, no evidence of acute intracranial abnormality. Mild senescent change.  There is plaque at both cervical carotid bifurcations, but 0% stenosis in both internal carotid arteries. Both vertebral arteries are patent throughout the neck.  Normal head CT angiogram, no acute intracranial vascular abnormality.  This report was finalized on 7/14/2025 9:30 PM by Dr. Antonio Fuentes M.D on Workstation: [id number]      XR Chest 1 View  Result Date: 7/14/2025  AP CHEST  HISTORY: Acute stroke protocol  COMPARISON: None available  FINDINGS: Lung fields are clear. Heart size within normal limits. No evidence of pneumothorax. No acute bony abnormality      No active disease    This report was finalized on 7/14/2025 9:11 PM by Dr. Adrian Hinojosa M.D on Workstation: [id number]          MEDICATIONS GIVEN IN ER  Medications   sodium chloride 0.9 % flush 10 mL (has no administration in time range)   labetalol (NORMODYNE,TRANDATE) injection 40 mg (0 mg Intravenous Hold 7/14/25 0718)   iopamidol (ISOVUE-370) 76 % injection 100 mL (95 mL  Intravenous Given by Other 7/14/25 2047)   labetalol (NORMODYNE,TRANDATE) injection 10 mg (10 mg Intravenous Given 7/14/25 2106)   labetalol (NORMODYNE,TRANDATE) injection 20 mg (20 mg Intravenous Given 7/14/25 2214)         ORDERS PLACED DURING THIS VISIT:  Orders Placed This Encounter   Procedures    CT Angiogram Head w AI Analysis of LVO    CT Angiogram Neck    XR Chest 1 View    [place] Draw    Comprehensive Metabolic Panel    Protime-INR    aPTT    CBC Auto Differential    NPO Diet NPO Type: Strict NPO    Perform NIH Stroke Scale    Measure Actual Weight    Notify Provider    Notify Provider for SBP Greater Than 140 for Hemorrhagic Stroke Patient    Head of Bed 30 Degrees or Less    Undress and Gown    Continuous Pulse Oximetry    Vital Signs    Neuro Checks    No Hypotonic Fluids    Nursing Dysphagia Screening (Complete Prior to Giving anything PO)    RN to Place Order SLP Consult (IF swallow screen failed) - Eval & Treat Choosing Reason of RN Dysphagia Screen Failed    [facility] (on-call MD unless specified) Details    Oxygen Therapy- Nasal Cannula; Titrate 1-6 LPM Per SpO2; 90 - 95%    POC Glucose Once    ECG 12 Lead Stroke Evaluation    Type & Screen    Insert Large-Bore Peripheral IV - RIGHT AC Preferred    Inpatient Admission    CBC & Differential    Green Top (Gel)    Lavender Top    Gold Top - SST    Light Blue Top         OUTPATIENT MEDICATION MANAGEMENT:  Current Facility-Administered Medications Ordered in Epic   Medication Dose Route Frequency Provider Last Rate Last Admin    labetalol (NORMODYNE,TRANDATE) injection 40 mg  40 mg Intravenous Once Tushar Boyd PA        sodium chloride 0.9 % flush 10 mL  10 mL Intravenous PRN Tushar Boyd PA         No current [facility]-ordered outpatient medications on file.             PROGRESS, DATA ANALYSIS, CONSULTS, AND MEDICAL DECISION MAKING  All labs have been independently interpreted by me.  All radiology studies have been reviewed by me. All EKG's have been  independently viewed and interpreted by me.  Discussion below represents my analysis of pertinent findings related to patient's condition, differential diagnosis, treatment plan and final disposition.    Patient presentation and evaluation most consistent with strokelike episode with poorly controlled hypertension requiring admission for neurology consult and further supportive care.  Patient agreeable with all questions answered.    DIFFERENTIAL DIAGNOSIS INCLUDE BUT NOT LIMITED TO:     Stroke, TIA, hypertensive emergency    Clinical Scores: N/A      ED Course as of 07/14/25 2331   Mon Jul 14, 2025 2027 I discussed the case with MD Sammie with Stroke Neurology at this time regarding the patient.  I discussed work-up, results, concerns.  I discussed the consulting provider's desire for obtaining stat CTA head and neck with subsequent admission to the observation unit for further stroke evaluation.  Notification for TNK or mechanical intervention at this time as the patient has no symptoms currently.   [DC]   2044 Hemoglobin: 15.8 [TR]   2044 EKG PROCEDURE    EKG time: 2027  Rhythm/Rate: Normal sinus, rate 75  P waves and CT: Normal P, normal CT  QRS, axis: Narrow QRS, normal axis  ST and T waves: No acute    Independently Interpreted by me  No prior EKG available for comparison   [TR]   2119 INR(!): 1.11 [TR]   2119 Potassium(!): 3.3 [TR]   2119 PTT: 26.3 [TR]   2119 BP(!): 219/104 [TR]   2119 XR Chest 1 View  My independent interpretation of the imaging study is no dense consolidation [TR]   2317 BP(!): 184/93 [DC]   2329 I discussed the case with CHILANGO Quiñones with [facility] at this time regarding the patient.  I discussed work-up, results, concerns.  I discussed the consulting provider's desire for tele admit to MD Heydi.   [DC]      ED Course User Index  [DC] Tushar Boyd PA  [TR] Jeff Benitez MD         2313 I rechecked the patient.  I discussed the patient's labs, radiology findings (including all  incidental findings), diagnosis, and plan for admission. The patient understands and agrees with the plan.      AS OF 23:31 EDT VITALS:    BP - (!) 184/93  HR - 63  TEMP - 97.8 °F (36.6 °C) (Oral)  O2 SATS - 97%    COMPLEXITY OF CARE  The patient requires admission.      DIAGNOSIS  Final diagnoses:   Stroke-like symptom   Right sided numbness   Hypertensive crisis         DISPOSITION  ED Disposition       ED Disposition   Decision to Admit    Condition   --    Comment   Level of Care: Med/Surg [1]   Diagnosis: Stroke-like symptoms [140640]   Admitting Physician: MARY REDDY [[id number]]   Attending Physician: MARY REDDY [[id number]]   Certification: I Certify That Inpatient Hospital Services Are Medically Necessary For Greater Than 2 Midnights                  Please note that portions of this document were completed with a voice recognition program.    Note Disclaimer: At [facility], we believe that sharing information builds trust and better relationships. You are receiving this note because you recently visited [facility]. It is possible you will see health information before a provider has talked with you about it. This kind of information can be easy to misunderstand. To help you fully understand what it means for your health, we urge you to discuss this note with your provider.         Tushar Boyd PA  07/15/25 4705

## 2025-07-15 NOTE — THERAPY EVALUATION
Patient Name: Tushar Herron  : 1950    MRN: [id number]                              Today's Date: 7/15/2025       Admit Date: 2025    Visit Dx:     ICD-10-CM ICD-9-CM   1. Stroke-like symptom  R29.90 781.99   2. Right sided numbness  R20.0 782.0   3. Hypertensive crisis  I16.9 401.9     Patient Active Problem List   Diagnosis    Stroke-like symptoms    Hypertension    Hypercholesterolemia     Past Medical History:   Diagnosis Date    Elevated cholesterol     Gout     Hypertension     Prostate cancer      History reviewed. No pertinent surgical history.   General Information       Row Name 07/15/25 1009          Physical Therapy Time and Intention    Document Type evaluation  -     Mode of Treatment physical therapy  -       Row Name 07/15/25 1009          General Information    Patient Profile Reviewed yes  -     Prior Level of Function independent:;gait;transfer;bed mobility  -     Existing Precautions/Restrictions fall  -     Barriers to Rehab none identified  -       Row Name 07/15/25 1009          Living Environment    Current Living Arrangements apartment  -     People in Home alone  -       Row Name 07/15/25 1009          Stairs Within Home, Primary    Stairs, Within Home, Primary 2nd floor apartment - reports 4 sets of 7 LINDA  -       Row Name 07/15/25 1009          Cognition    Orientation Status (Cognition) oriented x 4  -[facility] Name 07/15/25 1009          Safety Issues/Impairments Affecting Functional Mobility    Impairments Affecting Function (Mobility) balance  -               User Key  (r) = Recorded By, (t) = Taken By, (c) = Cosigned By      Initials Name Provider Type     Dianne Gil PT Physical Therapist                   Mobility       Row Name 07/15/25 1010          Bed Mobility    Bed Mobility supine-sit;sit-supine  -     Supine-Sit [place] (Bed Mobility) independent  -     Sit-Supine [place] (Bed Mobility) independent  -       Row Name  07/15/25 1010          Sit-Stand Transfer    Sit-Stand [place] (Transfers) supervision  -[facility] Name 07/15/25 1010          Gait/Stairs (Locomotion)    [place] Level (Gait) supervision  -     Assistive Device (Gait) other (see comments)  No AD  -     Distance in Feet (Gait) 120  -     Comment, (Gait/Stairs) Mild unsteadiness especially with turns but able to self-correct  -               User Key  (r) = Recorded By, (t) = Taken By, (c) = Cosigned By      Initials Name Provider Type     Dianne Gil PT Physical Therapist                   Obj/Interventions       [facility] Name 07/15/25 1010          Range of Motion Comprehensive    General Range of Motion bilateral lower extremity ROM WFL  -BH       Row Name 07/15/25 1010          Strength Comprehensive (MMT)    General Manual Muscle Testing (MMT) Assessment lower extremity strength deficits identified  -     Comment, General Manual Muscle Testing (MMT) Assessment Generalized weakness, BLE grossly 4/5 - no unilateral deficits noted  -BH       Row Name 07/15/25 1010          Balance    Balance Assessment sitting static balance;sitting dynamic balance;standing static balance;standing dynamic balance  -     Static Sitting Balance independent  -     Dynamic Sitting Balance independent  -     Position, Sitting Balance unsupported;sitting edge of bed  -     Static Standing Balance supervision  -     Dynamic Standing Balance supervision  -     Position/Device Used, Standing Balance unsupported  -     Balance Interventions sitting;standing;sit to stand;static;dynamic  -BH       Row Name 07/15/25 1010          Sensory Assessment (Somatosensory)    Sensory Assessment (Somatosensory) LE sensation intact  -               User Key  (r) = Recorded By, (t) = Taken By, (c) = Cosigned By      Initials Name Provider Type     Dianne Gil PT Physical Therapist                   Goals/Plan    No documentation.                  Clinical  Impression       Row Name 07/15/25 1011          Pain    Pretreatment Pain Rating 0/10 - no pain  -     Posttreatment Pain Rating 0/10 - no pain  -BH       Row Name 07/15/25 1011          Plan of Care Review    Plan of Care Reviewed With patient  -     Outcome Evaluation Pt is a 76 yo M admitted from home with R sided weakness and numbness - MRI completed this AM, awaiting neuro consult though pt reports resolution of symptoms. Pt lives alone in a 2nd floor apartment and reports independence at BL with no AD. Pt reports 4 sets of 7 LINDA, no difficulty at BL. Pt presents to PT with minimal functional mobility deficits, reports feeling at his BL. Pt transferred to EOB independently and stood with SV for safety. Ambulated to bathroom and then 120ft in hallway with no AD and SV for safety. Pt mildly unsteady with turns but no overt LOB - likely d/t minimal mobility, encouraged continuing to call out for assist up to the bathroom and progressing gait distance in hallway as able. Pt returned to room and back to bed, left with needs met. Pt safe to DC home with family assist as needed. PT will sign-off.  -BH       Row Name 07/15/25 1011          Therapy Assessment/Plan (PT)    Criteria for Skilled Interventions Met (PT) no;no problems identified which require skilled intervention  -     Therapy Frequency (PT) evaluation only  -BH       Row Name 07/15/25 1011          Vital Signs    O2 Delivery Pre Treatment room air  -     O2 Delivery Intra Treatment room air  -     O2 Delivery Post Treatment room air  [facility] 07/15/25 1011          Positioning and Restraints    Pre-Treatment Position in bed  -     Post Treatment Position bed  -     In Bed fowlers;call light within reach;encouraged to call for assist;exit alarm on  -               User Key  (r) = Recorded By, (t) = Taken By, (c) = Cosigned By      Initials Name Provider Type     Dianne Gil, PT Physical Therapist                   Outcome  Measures       Row Name 07/15/25 1016 07/15/25 0216       How much help from another person do you currently need...    Turning from your back to your side while in flat bed without using bedrails? 4  - 4  -BM    Moving from lying on back to sitting on the side of a flat bed without bedrails? 4  - 4  -BM    Moving to and from a bed to a chair (including a wheelchair)? 4  - 4  -BM    Standing up from a chair using your arms (e.g., wheelchair, bedside chair)? 4  - 4  -BM    Climbing 3-5 steps with a railing? 4  - 4  -BM    To walk in hospital room? 4  - 4  -BM    AM-PAC 6 Clicks Score (PT) 24  - 24  -BM    Highest Level of Mobility Goal Walk 250 Feet or More - 8  - Walk 250 Feet or More - 8  -BM      Row Name 07/15/25 1016          Functional Assessment    Outcome Measure Options AM-PAC 6 Clicks Basic Mobility (PT)  -               User Key  (r) = Recorded By, (t) = Taken By, (c) = Cosigned By      Initials Name Provider Type     Dianne Gil, PT Physical Therapist    Colette Maynard, RN Registered Nurse                                 Physical Therapy Education       Title: PT OT SLP Therapies (In Progress)       Topic: Physical Therapy (In Progress)       Point: Mobility training (Done)       Learning Progress Summary            Patient Acceptance, E,TB,D, VU,NR by  at 7/15/2025 1016                      Point: Home exercise program (Not Started)       Learner Progress:  Not documented in this visit.              Point: Body mechanics (Done)       Learning Progress Summary            Patient Acceptance, E,TB,D, VU,NR by  at 7/15/2025 1016                      Point: Precautions (Done)       Learning Progress Summary            Patient Acceptance, E,TB,D, VU,NR by  at 7/15/2025 1016                                      User Key       Initials Effective Dates Name Provider Type Discipline     04/08/22 -  Dianne Gil PT Physical Therapist PT                  PT  Recommendation and Plan     Outcome Evaluation: Pt is a 74 yo M admitted from home with R sided weakness and numbness - MRI completed this AM, awaiting neuro consult though pt reports resolution of symptoms. Pt lives alone in a 2nd floor apartment and reports independence at BL with no AD. Pt reports 4 sets of 7 LINDA, no difficulty at BL. Pt presents to PT with minimal functional mobility deficits, reports feeling at his BL. Pt transferred to EOB independently and stood with SV for safety. Ambulated to bathroom and then 120ft in hallway with no AD and SV for safety. Pt mildly unsteady with turns but no overt LOB - likely d/t minimal mobility, encouraged continuing to call out for assist up to the bathroom and progressing gait distance in hallway as able. Pt returned to room and back to bed, left with needs met. Pt safe to DC home with family assist as needed. PT will sign-off.     Time Calculation:         PT Charges       Row Name 07/15/25 1017             Time Calculation    Start Time 0947  -      Stop Time 0958  -      Time Calculation (min) 11 min  -      PT Received On 07/15/25  -         Time Calculation- PT    Total Timed Code Minutes- PT 9 minute(s)  -         Timed Charges    21538 - Gait Training Minutes  9  -BH         Total Minutes    Timed Charges Total Minutes 9  -BH       Total Minutes 9  -                User Key  (r) = Recorded By, (t) = Taken By, (c) = Cosigned By      Initials Name Provider Type     Dianne Gil, PT Physical Therapist                  Therapy Charges for Today       Code Description Service Date Service Provider Modifiers Qty    34922747877 HC GAIT TRAINING EA 15 MIN 7/15/2025 Dianne Gil, PT GP 1    35636023492 HC PT EVAL LOW COMPLEXITY 2 7/15/2025 Dianne Gil, PT GP 1            PT G-Codes  Outcome Measure Options: AM-PAC 6 Clicks Basic Mobility (PT)  AM-PAC 6 Clicks Score (PT): 24  PT Discharge Summary  Anticipated Discharge Disposition (PT): home  with assist    Dianne Gil, PT  7/15/2025

## 2025-07-15 NOTE — ED NOTES
Nursing report ED to floor  Tushar Herron  75 y.o.  male    HPI :  HPI  Stated Reason for Visit: pt arrived for right sided weakness. lkw 1910  History Obtained From: EMS    Chief Complaint  Chief Complaint   Patient presents with    Extremity Weakness       Admitting doctor:   Gokul Soliz MD    Admitting diagnosis:   The primary encounter diagnosis was Stroke-like symptom. Diagnoses of Right sided numbness and Hypertensive crisis were also pertinent to this visit.    Code status:   Current Code Status       Date Active Code Status Order ID Comments User Context       7/14/2025 2340 CPR (Attempt to Resuscitate) [id number]  Sussy Avila, [facility]        Question Answer    Code Status (Patient has no pulse and is not breathing) CPR (Attempt to Resuscitate)    Medical Interventions (Patient has pulse or is breathing) Full                    Allergies:   Penicillins    Isolation:   No active isolations    Intake and Output  No intake or output data in the 24 hours ending 07/15/25 0052    Weight:       07/14/25 2024   Weight: 99.7 kg (219 lb 12.8 oz)       Most recent vitals:   Vitals:    07/14/25 2214 07/14/25 2216 07/14/25 2314 07/15/25 0031   BP: (!) 223/108 (!) 215/99 (!) 184/93 152/79   Pulse: 65 65 63 67   Resp:    17   Temp:       TempSrc:       SpO2:  99% 97% 98%   Weight:       Height:           Active LDAs/IV Access:   Lines, Drains & Airways       Active LDAs       Name Placement date Placement time Site Days    Peripheral IV 07/14/25 2022 18 G Left Antecubital 07/14/25 2022  Antecubital  less than 1    Peripheral IV 07/14/25 2026 18 G Right Antecubital 07/14/25 2026  Antecubital  less than 1                    Labs (abnormal labs have a star):   Labs Reviewed   COMPREHENSIVE METABOLIC PANEL - Abnormal; Notable for the following components:       Result Value    Glucose 106 (*)     Sodium 133 (*)     Potassium 3.3 (*)     CO2 20.2 (*)     All other components within normal limits    Narrative:      GFR Categories in Chronic Kidney Disease (CKD)              GFR Category          GFR (mL/min/1.73)    Interpretation  G1                    90 or greater        Normal or high (1)  G2                    60-89                Mild decrease (1)  G3a                   45-59                Mild to moderate decrease  G3b                   30-44                Moderate to severe decrease  G4                    15-29                Severe decrease  G5                    14 or less           Kidney failure    (1)In the absence of evidence of kidney disease, neither GFR category G1 or G2 fulfill the criteria for CKD.    eGFR calculation 2021 CKD-EPI creatinine equation, which does not include race as a factor   PROTIME-INR - Abnormal; Notable for the following components:    INR 1.11 (*)     All other components within normal limits   CBC WITH AUTO DIFFERENTIAL - Abnormal; Notable for the following components:    MCHC 31.3 (*)     Immature Grans % 0.6 (*)     All other components within normal limits   APTT - Normal   RAINBOW DRAW    Narrative:     The following orders were created for panel order [place] Draw.  Procedure                               Abnormality         Status                     ---------                               -----------         ------                     Green Top (Gel)[[id number]]                                  Final result               Lavender Top[[id number]]                                     Final result               Gold Top - SST[[id number]]                                   Final result               Light Blue Top[[id number]]                                   Final result                 Please view results for these tests on the individual orders.   BASIC METABOLIC PANEL   CBC (NO DIFF)   HEMOGLOBIN A1C   LIPID PANEL   TSH   POCT GLUCOSE FINGERSTICK   POCT GLUCOSE FINGERSTICK   POCT GLUCOSE FINGERSTICK   POCT GLUCOSE FINGERSTICK   POCT GLUCOSE FINGERSTICK   POCT GLUCOSE FINGERSTICK    TYPE AND SCREEN   CBC AND DIFFERENTIAL    Narrative:     The following orders were created for panel order CBC & Differential.  Procedure                               Abnormality         Status                     ---------                               -----------         ------                     CBC Auto Differential[[id number]]        Abnormal            Final result                 Please view results for these tests on the individual orders.   GREEN TOP   LAVENDER TOP   GOLD TOP - SST   LIGHT BLUE TOP       EKG:   ECG 12 Lead Stroke Evaluation   Preliminary Result   HEART RATE=75  bpm   RR Hqagnozd=706  ms   CT Uvxgqvoq=732  ms   P Horizontal Axis=-22  deg   P Front Axis=32  deg   QRSD Cguqrbpr=941  ms   QT Owrppoou=037  ms   XYgL=401  ms   QRS Axis=25  deg   T Wave Axis=31  deg   - NORMAL ECG -   Sinus rhythm   When compared with ECG of 07-May-2013 14:30:34,   No significant change   Date and Time of Study:2025-07-14 20:27:15          Meds given in ED:   Medications   sodium chloride 0.9 % flush 10 mL (has no administration in time range)   labetalol (NORMODYNE,TRANDATE) injection 40 mg (0 mg Intravenous Hold 7/14/25 4236)   nitroglycerin (NITROSTAT) SL tablet 0.4 mg (has no administration in time range)   sennosides-docusate (PERICOLACE) 8.6-50 MG per tablet 2 tablet (has no administration in time range)     And   polyethylene glycol (MIRALAX) packet 17 g (has no administration in time range)     And   bisacodyl (DULCOLAX) EC tablet 5 mg (has no administration in time range)     And   bisacodyl (DULCOLAX) suppository 10 mg (has no administration in time range)   ondansetron ODT (ZOFRAN-ODT) disintegrating tablet 4 mg (has no administration in time range)     Or   ondansetron (ZOFRAN) injection 4 mg (has no administration in time range)   sodium chloride 0.9 % infusion (75 mL/hr Intravenous New Bag 7/15/25 0050)   acetaminophen (TYLENOL) tablet 650 mg (has no administration in time range)     Or    acetaminophen (TYLENOL) suppository 650 mg (has no administration in time range)   labetalol (NORMODYNE,TRANDATE) injection 10 mg (has no administration in time range)   aspirin tablet 325 mg (has no administration in time range)     Or   aspirin suppository 300 mg (has no administration in time range)   atorvastatin (LIPITOR) tablet 80 mg (80 mg Oral Given 7/15/25 0050)   iopamidol (ISOVUE-370) 76 % injection 100 mL (95 mL Intravenous Given by Other 7/14/25 2047)   labetalol (NORMODYNE,TRANDATE) injection 10 mg (10 mg Intravenous Given 7/14/25 2106)   labetalol (NORMODYNE,TRANDATE) injection 20 mg (20 mg Intravenous Given 7/14/25 2214)       Imaging results:  CT Angiogram Head w AI Analysis of LVO  Result Date: 7/14/2025   Negative unenhanced head CT, no evidence of acute intracranial abnormality. Mild senescent change.  There is plaque at both cervical carotid bifurcations, but 0% stenosis in both internal carotid arteries. Both vertebral arteries are patent throughout the neck.  Normal head CT angiogram, no acute intracranial vascular abnormality.  This report was finalized on 7/14/2025 9:30 PM by Dr. Antonio Fuentes M.D on Workstation: [id number]      CT Angiogram Neck  Result Date: 7/14/2025   Negative unenhanced head CT, no evidence of acute intracranial abnormality. Mild senescent change.  There is plaque at both cervical carotid bifurcations, but 0% stenosis in both internal carotid arteries. Both vertebral arteries are patent throughout the neck.  Normal head CT angiogram, no acute intracranial vascular abnormality.  This report was finalized on 7/14/2025 9:30 PM by Dr. Antonio Fuentes M.D on Workstation: [id number]      XR Chest 1 View  Result Date: 7/14/2025  No active disease    This report was finalized on 7/14/2025 9:11 PM by Dr. Adrian Hinojosa M.D on Workstation: [id number]        Ambulatory status:   -assist x1    Social issues:   Social History     Socioeconomic History    Marital  status: Single       Peripheral Neurovascular  Peripheral Neurovascular (Adult)  Peripheral Neurovascular WDL: WDL, capillary refill  Capillary Refill, General: less than/equal to 3 secs    Neuro Cognitive  Neuro Cognitive (Adult)  Cognitive/Neuro/Behavioral WDL: WDL  Level of Consciousness: Alert  Orientation: oriented x 4  Pupils  Pupil PERRLA: yes  [place] Coma Scale  Best Eye Response: 4-->(E4) spontaneous  Best Motor Response: 6-->(M6) obeys commands  Best Verbal Response: 5-->(V5) oriented  [place] Coma Scale Score: 15  NIH Stroke Scale  1a. Level of Consciousness: 0-->Alert, keenly responsive  1b. LOC Questions: 0-->Answers both questions correctly  1c. LOC Commands: 0-->Performs both tasks correctly  2. Best Gaze: 0-->Normal  3. Visual: 0-->No visual loss  4. Facial Palsy: 0-->Normal symmetrical movements  5a. Motor Arm, Left: 0-->No drift, limb holds 90 (or 45) degrees for full 10 secs  5b. Motor Arm, Right: 0-->No drift, limb holds 90 (or 45) degrees for full 10 secs  6a. Motor Leg, Left: 0-->No drift, leg holds 30 degree position for full 5 secs  6b. Motor Leg, Right: 0-->No drift, leg holds 30 degree position for full 5 secs  7. Limb Ataxia: 0-->Absent  8. Sensory: 0-->Normal, no sensory loss  9. Best Language: 0-->No aphasia, normal  10. Dysarthria: 0-->Normal  11. Extinction and Inattention (formerly Neglect): 0-->No abnormality  Total (NIH Stroke Scale): 0    Learning  Learning Assessment  Learning Readiness and Ability: no barriers identified  Education Provided  Person Taught: patient    Respiratory  Respiratory WDL  Respiratory WDL: WDL    Abdominal Pain  Abdominal Pain CPG Interventions  Coping Interventions: anticipatory guidance provided, care explained to patient/family prior to performing  Safety Interventions  Safety Precautions/Falls Reduction: assistive device/personal items within reach  All Alarms: alarm(s) activated and audible    Pain Assessments  Pain (Adult)  (0-10) Pain Rating: Rest:  0    NIH Stroke Scale  NIH Stroke Scale  1a. Level of Consciousness: 0-->Alert, keenly responsive  1b. LOC Questions: 0-->Answers both questions correctly  1c. LOC Commands: 0-->Performs both tasks correctly  2. Best Gaze: 0-->Normal  3. Visual: 0-->No visual loss  4. Facial Palsy: 0-->Normal symmetrical movements  5a. Motor Arm, Left: 0-->No drift, limb holds 90 (or 45) degrees for full 10 secs  5b. Motor Arm, Right: 0-->No drift, limb holds 90 (or 45) degrees for full 10 secs  6a. Motor Leg, Left: 0-->No drift, leg holds 30 degree position for full 5 secs  6b. Motor Leg, Right: 0-->No drift, leg holds 30 degree position for full 5 secs  7. Limb Ataxia: 0-->Absent  8. Sensory: 0-->Normal, no sensory loss  9. Best Language: 0-->No aphasia, normal  10. Dysarthria: 0-->Normal  11. Extinction and Inattention (formerly Neglect): 0-->No abnormality  Total (NIH Stroke Scale): 0    Edison Fernandes RN  07/15/25 00:52 EDT

## 2025-07-15 NOTE — PLAN OF CARE
Goal Outcome Evaluation:      NIH 0, BP slightly elevated. No complaints of pain, numbness, or tingling. MRI to be completed. Neuro consult placed. Ambulating SBA. Will continue to monitor.

## 2025-07-15 NOTE — THERAPY EVALUATION
Patient Name: Tushar Herron  : 1950    MRN: [id number]                              Today's Date: 7/15/2025       Admit Date: 2025    Visit Dx:     ICD-10-CM ICD-9-CM   1. Stroke-like symptom  R29.90 781.99   2. Right sided numbness  R20.0 782.0   3. Hypertensive crisis  I16.9 401.9     Patient Active Problem List   Diagnosis    Stroke-like symptoms    Hypertension    Hypercholesterolemia     Past Medical History:   Diagnosis Date    Elevated cholesterol     Gout     Hypertension     Prostate cancer      History reviewed. No pertinent surgical history.   General Information       [facility] Name 07/15/25 1127          OT Time and Intention    Subjective Information no complaints (P)   -TW     Document Type discharge evaluation/summary (P)   -TW     Mode of Treatment co-treatment;occupational therapy;physical therapy (P)   -TW     Patient Effort excellent (P)   -TW     Symptoms Noted During/After Treatment none (P)   -TW       [facility] Name 07/15/25 1127          General Information    Patient Profile Reviewed yes (P)   -TW     Prior Level of Function independent:;ADL's;gait;community mobility;all household mobility (P)   -TW     Existing Precautions/Restrictions fall (P)   -TW     Barriers to Rehab none identified (P)   -TW       [facility] Name 07/15/25 112          Living Environment    Current Living Arrangements apartment (P)   -TW     People in Home alone (P)   -TW       [facility] Name 07/15/25 1127          Home Main Entrance    Number of Stairs, Main Entrance -- (P)   ~28 stairs up to his front door  -TW     Stair Railings, Main Entrance railings safe and in good condition (P)   -TW       Row Name 07/15/25 1127          Stairs Within Home, Primary    Stairs, Within Home, Primary lives in a single story apartment on the second floor (P)   -TW     Number of Stairs, Within Home, Primary none (P)   -TW     Stair Railings, Within Home, Primary none (P)   -TW       [facility] Name 07/15/25 1127          Cognition    Orientation  Status (Cognition) oriented x 4 (P)   -TW       Row Name 07/15/25 1127          Safety Issues/Impairments Affecting Functional Mobility    Impairments Affecting Function (Mobility) balance (P)   -               User Key  (r) = Recorded By, (t) = Taken By, (c) = Cosigned By      Initials Name Provider Type    TW Henry Orellana OT Student OT Student                     Mobility/ADL's       [facility] 07/15/25 1128          Bed Mobility    Bed Mobility bed mobility (all) activities (P)   -     Supine-Sit [place] (Bed Mobility) standby assist (P)   -TW       Row Name 07/15/25 1128          Transfers    Transfers sit-stand transfer;stand-sit transfer (P)   -TW       Row Name 07/15/25 1128          Sit-Stand Transfer    Sit-Stand [place] (Transfers) standby assist (P)   -TW       Row Name 07/15/25 1128          Stand-Sit Transfer    Stand-Sit [place] (Transfers) standby assist (P)   -TW       Row Name 07/15/25 1128          Functional Mobility    Functional Mobility- Ind. Level standby assist (P)   -TW       Row Name 07/15/25 1128          Activities of Daily Living    BADL Assessment/Intervention lower body dressing;grooming;toileting (P)   -TW       Row Name 07/15/25 1128          Lower Body Dressing Assessment/Training    [place] Level (Lower Body Dressing) lower body dressing skills;doff;don;shoes/slippers;standby assist (P)   -     Position (Lower Body Dressing) edge of bed sitting (P)   -TW       Row Name 07/15/25 1128          Grooming Assessment/Training    [place] Level (Grooming) grooming skills;wash face, hands;standby assist (P)   -     Position (Grooming) unsupported standing (P)   -TW       Row Name 07/15/25 1128          Toileting Assessment/Training    [place] Level (Toileting) toileting skills;standby assist (P)   -     Position (Toileting) unsupported sitting (P)   -               User Key  (r) = Recorded By, (t) = Taken By, (c) = Cosigned By      Initials Name  Provider Type    TW Henry Orellana, OT Student OT Student                   Obj/Interventions       Row Name 07/15/25 1129          Sensory Assessment (Somatosensory)    Sensory Assessment Sensation WFL throughout all functional activities (P)   -TW       Row Name 07/15/25 1129          Vision Assessment/Intervention    Visual Impairment/Limitations WFL (P)   -TW       Row Name 07/15/25 1129          Range of Motion Comprehensive    General Range of Motion bilateral upper extremity ROM WFL (P)   -TW       Row Name 07/15/25 1129          Strength Comprehensive (MMT)    Comment, General Manual Muscle Testing (MMT) Assessment generalized weakness, BUE 4/5 (P)   -TW       Row Name 07/15/25 1129          Balance    Comment, Balance all seated and standing balance activities required SBA (P)   -               User Key  (r) = Recorded By, (t) = Taken By, (c) = Cosigned By      Initials Name Provider Type    TW Henry Orellana, OT Student OT Student                   Goals/Plan    No documentation.                  Clinical Impression       Row Name 07/15/25 1130          Pain Assessment    Pretreatment Pain Rating 0/10 - no pain (P)   -TW     Posttreatment Pain Rating 0/10 - no pain (P)   -TW       Row Name 07/15/25 1130          Plan of Care Review    Plan of Care Reviewed With patient (P)   -TW     Progress no change (P)   -TW     Outcome Evaluation The pt was admitted to [facility] 2/2 to right sided weakness and numbness, an MRI was completed this AM and are awaiting neuro to consult. The pt lives alone in an apartment on the second floor with ~28 steps up to his front door. He states that he is (I) in all ADLs and does not use any AD/AE at baseline. The pt was seen this AM for an OT/PT co-evaluation on this service date. The pt was supine upon arrival and was A&O x4. The pt stated he feels like he is back at his baseline for all ADL/self-care tasks. The pt participated in home simulation of bed mobility, grooming, toileting,  LBD, and household mobility all with SBA. The pt was encouraged to call for nsg for bathroom and mobilizing needs while admitted. Home with assist recommended at d/c. OT will sign off. (P)   -TW       Row Name 07/15/25 1130          Therapy Assessment/Plan (OT)    Criteria for Skilled Therapeutic Interventions Met (OT) no problems identified which require skilled intervention (P)   -TW     Therapy Frequency (OT) evaluation only (P)   -TW       Row Name 07/15/25 1130          Therapy Plan Review/Discharge Plan (OT)    Anticipated Discharge Disposition (OT) home with assist;home (P)   -TW       Row Name 07/15/25 1130          Vital Signs    O2 Delivery Pre Treatment room air (P)   -TW     O2 Delivery Intra Treatment room air (P)   -TW     O2 Delivery Post Treatment room air (P)   -TW     Pre Patient Position Supine (P)   -TW     Intra Patient Position Standing (P)   -TW     Post Patient Position Supine (P)   -TW       Row Name 07/15/25 1130          Positioning and Restraints    Pre-Treatment Position in bed (P)   -TW     Post Treatment Position bed (P)   -TW     In Bed notified nsg;supine;fowlers;call light within reach;encouraged to call for assist;exit alarm on (P)   -TW               User Key  (r) = Recorded By, (t) = Taken By, (c) = Cosigned By      Initials Name Provider Type    TW Henry Orellana OT Student OT Student                   Outcome Measures       Row Name 07/15/25 1131          How much help from another is currently needed...    Putting on and taking off regular lower body clothing? 4 (P)   -TW     Bathing (including washing, rinsing, and drying) 4 (P)   -TW     Toileting (which includes using toilet bed pan or urinal) 4 (P)   -TW     Putting on and taking off regular upper body clothing 4 (P)   -TW     Taking care of personal grooming (such as brushing teeth) 4 (P)   -TW     Eating meals 4 (P)   -TW     AM-PAC 6 Clicks Score (OT) 24 (P)   -TW       Row Name 07/15/25 1016 07/15/25 0840       How  much help from another person do you currently need...    Turning from your back to your side while in flat bed without using bedrails? 4  - 4  -SD (r) LM (t) SD (c)    Moving from lying on back to sitting on the side of a flat bed without bedrails? 4  - 4  -SD (r) LM (t) SD (c)    Moving to and from a bed to a chair (including a wheelchair)? 4  - 4  -SD (r) LM (t) SD (c)    Standing up from a chair using your arms (e.g., wheelchair, bedside chair)? 4  - 4  -SD (r) LM (t) SD (c)    Climbing 3-5 steps with a railing? 4  - 4  -SD (r) LM (t) SD (c)    To walk in hospital room? 4  - 4  -SD (r) LM (t) SD (c)    AM-PAC 6 Clicks Score (PT) 24  - 24  -SD (r) LM (t)    Highest Level of Mobility Goal Walk 250 Feet or More - 8  -BH Walk 250 Feet or More - 8  -SD (r) LM (t)      Row Name 07/15/25 0216          How much help from another person do you currently need...    Turning from your back to your side while in flat bed without using bedrails? 4  -BM     Moving from lying on back to sitting on the side of a flat bed without bedrails? 4  -BM     Moving to and from a bed to a chair (including a wheelchair)? 4  -BM     Standing up from a chair using your arms (e.g., wheelchair, bedside chair)? 4  -BM     Climbing 3-5 steps with a railing? 4  -BM     To walk in hospital room? 4  -BM     AM-PAC 6 Clicks Score (PT) 24  -BM     Highest Level of Mobility Goal Walk 250 Feet or More - 8  -BM       Row Name 07/15/25 1131          Modified Claudia Scale    Modified Dickson Scale 1 - No significant disability despite symptoms.  Able to carry out all usual duties and activities. (P)   -TW       Row Name 07/15/25 1131 07/15/25 1016       Functional Assessment    Outcome Measure Options AM-PAC 6 Clicks Daily Activity (OT);Modified Claudia (P)   -[facility]-PAC 6 Clicks Basic Mobility (PT)  -              User Key  (r) = Recorded By, (t) = Taken By, (c) = Cosigned By      Initials Name Provider Type    Jose Alejandro Davenport, RN  Registered Nurse    Dianne Sauceda, PT Physical Therapist    LM Ez Harkins, RN Extern Registered Nurse    BM Colette Roger, RN Registered Nurse    TW Henry Orellana, OT Student OT Student                    Occupational Therapy Education       Title: PT OT SLP Therapies (In Progress)       Topic: Occupational Therapy (Done)       Point: ADL training (Done)       Learning Progress Summary            Patient Acceptance, E,TB, VU by TW at 7/15/2025 1132    Comment: Role of OT, daily BUE exercises, OOB activity, use of call light to notify nsg of needs                      Point: Home exercise program (Done)       Learning Progress Summary            Patient Acceptance, E,TB, VU by TW at 7/15/2025 1132    Comment: Role of OT, daily BUE exercises, OOB activity, use of call light to notify nsg of needs                      Point: Precautions (Done)       Learning Progress Summary            Patient Acceptance, E,TB, VU by TW at 7/15/2025 1132    Comment: Role of OT, daily BUE exercises, OOB activity, use of call light to notify nsg of needs                      Point: Body mechanics (Done)       Learning Progress Summary            Patient Acceptance, E,TB, VU by TW at 7/15/2025 1132    Comment: Role of OT, daily BUE exercises, OOB activity, use of call light to notify nsg of needs                                      User Key       Initials Effective Dates Name Provider Type Discipline     04/30/25 -  Henry Orellana, NEREYDA Student OT Student OT                  OT Recommendation and Plan  Therapy Frequency (OT): (P) evaluation only  Plan of Care Review  Plan of Care Reviewed With: (P) patient  Progress: (P) no change  Outcome Evaluation: (P) The pt was admitted to [facility] 2/2 to right sided weakness and numbness, an MRI was completed this AM and are awaiting neuro to consult. The pt lives alone in an apartment on the second floor with ~28 steps up to his front door. He states that he is (I) in all ADLs and does  not use any AD/AE at baseline. The pt was seen this AM for an OT/PT co-evaluation on this service date. The pt was supine upon arrival and was A&O x4. The pt stated he feels like he is back at his baseline for all ADL/self-care tasks. The pt participated in home simulation of bed mobility, grooming, toileting, LBD, and household mobility all with SBA. The pt was encouraged to call for nsg for bathroom and mobilizing needs while admitted. Home with assist recommended at d/c. OT will sign off.     Time Calculation:   Evaluation Complexity (OT)  Review Occupational Profile/Medical/Therapy History Complexity: (P) brief/low complexity  Assessment, Occupational Performance/Identification of Deficit Complexity: (P) 1-3 performance deficits  Clinical Decision Making Complexity (OT): (P) problem focused assessment/low complexity  Overall Complexity of Evaluation (OT): (P) low complexity     Time Calculation- OT       Row Name 07/15/25 1133 07/15/25 1017          Time Calculation- OT    OT Start Time 0947 (P)   -TW --     OT Stop Time 0958 (P)   -TW --     OT Time Calculation (min) 11 min (P)   -TW --        Timed Charges    29134 - Gait Training Minutes  -- 9  -        Untimed Charges    OT Eval/Re-eval Minutes 11 (P)   -TW --        Total Minutes    Timed Charges Total Minutes -- 9  -     Untimed Charges Total Minutes 11 (P)   -TW --      Total Minutes 11 (P)   -TW 9  -BH               User Key  (r) = Recorded By, (t) = Taken By, (c) = Cosigned By      Initials Name Provider Type     Dianen Gil PT Physical Therapist    Henry Kong, OT Student OT Student                           Henry Orellana, OT Student  7/15/2025

## 2025-07-15 NOTE — PROGRESS NOTES
Discharge Planning Assessment  [facility]     Patient Name: Tushar Herron  MRN: [id number]  Today's Date: 7/15/2025    Admit Date: 7/14/2025    Plan: Return home - lives alone   Discharge Needs Assessment       Row Name 07/15/25 0947       Living Environment    People in Home alone    Current Living Arrangements apartment    Potentially Unsafe Housing Conditions none    Primary Care Provided by self    Provides Primary Care For no one    Family Caregiver if Needed sibling(s)    Family Caregiver Names Sister Porsche Nation [phone]    Quality of Family Relationships helpful    Able to Return to Prior Arrangements yes       Resource/Environmental Concerns    Resource/Environmental Concerns none    Transportation Concerns none       Transition Planning    Patient/Family Anticipates Transition to home    Patient/Family Anticipated Services at Transition none    Transportation Anticipated family or friend will provide       Discharge Needs Assessment    Readmission Within the Last 30 Days no previous admission in last 30 days    Equipment Currently Used at Home none    Concerns to be Addressed denies needs/concerns at this time    Anticipated Changes Related to Illness none    Equipment Needed After Discharge none                   Discharge Plan       Row Name 07/15/25 0948       Plan    Plan Return home - lives alone    Patient/Family in Agreement with Plan yes    Plan Comments Spoke with patient at bedside.  He lives alone in 2nd floor apartment with stairs and a handrail.  He is IADL, teaches at [employer], uses no DME, has never used HH or been to SNF.  PCP is Dr. Daniel Billy and pharmacy is [facility] [address]  Patient drives.  Sister Porsche Nation [phone] is emergency contact.  He plans to return home at [place].  CCP will follow.  Trevor FUNES                  Continued Care and Services - Admitted Since 7/14/2025    No active coordination exists.       Expected Discharge Date and Time       Expected Discharge  Date Expected Discharge Time    Jul 16, 2025            Demographic Summary       Row Name 07/15/25 0942       General Information    Admission Type inpatient    Arrived From home    Referral Source admission list    Reason for Consult discharge planning    Preferred Language English                   Functional Status       Row Name 07/15/25 0944       Functional Status    Usual Activity Tolerance good    Current Activity Tolerance good       Functional Status, IADL    Medications independent    Meal Preparation independent    Housekeeping independent    Laundry independent    Shopping independent       Mental Status    General Appearance WDL WDL       Mental Status Summary    Recent Changes in Mental Status/Cognitive Functioning no changes                               Becky S. [place], RN

## 2025-07-15 NOTE — THERAPY EVALUATION
Acute Care - Speech Language Pathology Initial Evaluation & Discharge Summary  [facility]     Patient Name: Tushar Herron  : 1950  MRN: [id number]  Today's Date: 7/15/2025               Admit Date: 2025     Visit Dx:    ICD-10-CM ICD-9-CM   1. Stroke-like symptom  R29.90 781.99   2. Right sided numbness  R20.0 782.0   3. Hypertensive crisis  I16.9 401.9     Patient Active Problem List   Diagnosis    Stroke-like symptoms    Hypertension    Hypercholesterolemia     Past Medical History:   Diagnosis Date    Elevated cholesterol     Gout     Hypertension     Prostate cancer      History reviewed. No pertinent surgical history.    SLP Recommendation and Plan  SLP Diagnosis: functional speech/language skills (07/15/25 1030)              SLC Criteria for Skilled Therapy Interventions Met: no problems identified which require skilled intervention (07/15/25 103)  Anticipated Discharge Disposition (SLP): No further SLP services warranted (07/15/25 103)        Therapy Frequency (SLP SLC): evaluation only (07/15/25 103)                                Outcome Evaluation: Patient seen for language evaluation. Pt presents with functional receptive and expressive language skills. SLP does not recommend therapy at this time. ST to s/o. (07/15/25 1047)      SLP EVALUATION (Last 72 Hours)       SLP SLC Evaluation       Row Name 07/15/25 1030                   Communication Assessment/Intervention    Document Type evaluation  -SH (r) KS (t) SD,SH (c)        Subjective Information no complaints  -SH (r) KS (t) SD,SH (c)        Patient Observations alert;cooperative  -SH (r) KS (t) SD,SH (c)        Patient Effort excellent  -SH (r) KS (t) SD,SH (c)        Symptoms Noted During/After Treatment none  -SH (r) KS (t) SD,SH (c)           General Information    Patient Profile Reviewed yes  -SH (r) KS (t) SD,SH (c)        Pertinent History Of Current Problem Admitted for right sided weakness/numbness, now resolved. 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  -SH (r) KS (t) SD,SH (c)        Precautions/Limitations, Vision WFL with corrective lenses  -SH (r) KS (t) SD,SH (c)        Precautions/Limitations, Hearing WFL;for purposes of eval  -SH (r) KS (t) SD,SH (c)        Prior Level of Function-Communication WFL  -SH (r) KS (t) SD,SH (c)        Plans/Goals Discussed with patient  -SH (r) KS (t) SD,SH (c)           Pain    Pretreatment Pain Rating 0/10 - no pain  -SH (r) KS (t) SD,SH (c)        Posttreatment Pain Rating 0/10 - no pain  -SH (r) KS (t) SD,SH (c)           Comprehension Assessment/Intervention    Comprehension Assessment/Intervention Auditory Comprehension  -SH (r) KS (t) SD,SH (c)           Auditory Comprehension Assessment/Intervention    Auditory Comprehension (Communication) WFL  -SH (r) KS (t) SD,SH (c)        Able to Identify Objects/Pictures (Communication) WFL;body part;familiar objects;pictures of common objects  -SH (r) KS (t) SD,SH (c)        Answers Questions (Communication) WFL;yes/no;personal;simple;mulit-unit;wh questions  -SH (r) KS (t) SD,SH (c)        Able to Follow Commands (Communication) WFL;1-step;multi-step  -SH (r) KS (t) SD,SH (c)        Narrative Discourse WFL;simple paragraph level  -SH (r) KS (t) SD,SH (c)           Expression Assessment/Intervention    Expression Assessment/Intervention verbal expression  -SH (r) KS (t) SD,SH (c)           Verbal Expression Assessment/Intervention    Verbal Expression WFL  -SH (r) KS (t) SD,SH (c)        Automatic Speech (Communication) WFL;response to greeting;alphabet;counting 1-20;days of week;months of year  -SH (r) KS (t) SD,SH (c)        Repetition WFL;words;phrases;sentences  -SH (r) KS (t) SD,SH (c)        Phrase Completion WFL;automatic/predictable;unpredictable  -SH (r) KS (t) SD,SH (c)        Responsive Naming WFL;simple  -SH (r) KS (t) SD,SH (c)        Confrontational Naming WFL;high frequency;low frequency   -SH (r) KS (t) SD,SH (c)        Sentence Formulation WFL;simple;complex  -SH (r) KS (t) SD,SH (c)        Conversational Discourse/Fluency WFL  -SH (r) KS (t) SD,SH (c)           SLP Evaluation Clinical Impressions    SLP Diagnosis functional speech/language skills  -SH (r) KS (t) SD,SH (c)        SLC Criteria for Skilled Therapy Interventions Met no problems identified which require skilled intervention  -SH (r) KS (t) SD,SH (c)        Functional Impact no impact on function  -SH (r) KS (t) SD,SH (c)           Recommendations    Therapy Frequency (SLP SLC) evaluation only  -SH (r) KS (t) SD,SH (c)        Anticipated Discharge Disposition (SLP) No further SLP services warranted  -SH (r) KS (t) SD,SH (c)                  User Key  (r) = Recorded By, (t) = Taken By, (c) = Cosigned By      Initials Name Effective Dates     Naila Garces, SLP 01/05/24 -     Jose Alejandro Davenport RN 12/28/23 -     Guy Peterson Speech Therapy Student 05/13/25 -                        EDUCATION  The patient has been educated in the following areas:     Communication Impairment.                        Time Calculation:      Time Calculation- SLP       Row Name 07/15/25 1053             Time Calculation- SLP    SLP Start Time 1030  -SD (r) KS (t) SD (c)      SLP Received On 07/15/25  -SD (r) KS (t) SD (c)                User Key  (r) = Recorded By, (t) = Taken By, (c) = Cosigned By      Initials Name Provider Type    Jose Alejandro Davenport, RN Registered Nurse    Guy Peterson Speech Therapy Student SLP Student                    Therapy Charges for Today       Code Description Service Date Service Provider Modifiers Qty    [id number] HC ST EVAL SPEECH AND PROD W LANG  4 7/15/2025 Guy Peña Speech Therapy Student GN 1                       Guy Peña Speech Therapy Student  7/15/2025

## 2025-07-15 NOTE — PLAN OF CARE
Goal Outcome Evaluation:  Plan of Care Reviewed With: patient           Outcome Evaluation: Pt is a 76 yo M admitted from home with R sided weakness and numbness - MRI completed this AM, awaiting neuro consult though pt reports resolution of symptoms. Pt lives alone in a 2nd floor apartment and reports independence at BL with no AD. Pt reports 4 sets of 7 LINDA, no difficulty at BL. Pt presents to PT with minimal functional mobility deficits, reports feeling at his BL. Pt transferred to EOB independently and stood with SV for safety. Ambulated to bathroom and then 120ft in hallway with no AD and SV for safety. Pt mildly unsteady with turns but no overt LOB - likely d/t minimal mobility, encouraged continuing to call out for assist up to the bathroom and progressing gait distance in hallway as able. Pt returned to room and back to bed, left with needs met. Pt safe to DC home with family assist as needed. PT will sign-off.    Anticipated Discharge Disposition (PT): home with assist

## 2025-07-15 NOTE — PLAN OF CARE
Goal Outcome Evaluation:  Plan of Care Reviewed With: (P) patient        Progress: (P) no change  Outcome Evaluation: (P) The pt was admitted to [facility] 2/2 to right sided weakness and numbness, an MRI was completed this AM and are awaiting neuro to consult. The pt lives alone in an apartment on the second floor with ~28 steps up to his front door. He states that he is (I) in all ADLs and does not use any AD/AE at baseline. The pt was seen this AM for an OT/PT co-evaluation on this service date. The pt was supine upon arrival and was A&O x4. The pt stated he feels like he is back at his baseline for all ADL/self-care tasks. The pt participated in home simulation of bed mobility, grooming, toileting, LBD, and household mobility all with SBA. The pt was encouraged to call for nsg for bathroom and mobilizing needs while admitted. Home with assist recommended at d/c. OT will sign off.    Anticipated Discharge Disposition (OT): (P) home with assist, home

## 2025-07-15 NOTE — ED PROVIDER NOTES
EMERGENCY DEPARTMENT MD ATTESTATION NOTE    Room Number:  04/04  PCP: Daniel Billy MD  Independent Historians: Patient and EMS    HPI:      Context: Tushar Herron is a 75 y.o. male with a medical history of hypertension, hypercholesterolemia who presents to the ED c/o acute right sided weakness and numbness.  The patient reports that he developed right sided facial arm and leg altered sensation.  EMS noted right sided weakness.  All of his symptoms resolved on the way to the hospital.  He denies prior similar episodes.  He was brought to room 4 by EMS and I evaluated him upon arrival.        Review of prior external notes (non-ED) -and- Review of prior external test results outside of this encounter: Chest x-ray 8 7/22/2024 showed no acute process    Prescription drug monitoring program review:           PHYSICAL EXAM    I have reviewed the triage vital signs and nursing notes.    ED Triage Vitals [07/14/25 2023]   Temp Heart Rate Resp BP SpO2   -- 78 19 (!) 217/106 96 %      Temp src Heart Rate Source Patient Position BP Location FiO2 (%)   -- Monitor -- -- --       Physical Exam  GENERAL: Awake, alert, no acute distress  SKIN: Warm, dry  HENT: Normocephalic, atraumatic  EYES: no scleral icterus  CV: regular rhythm, regular rate  RESPIRATORY: normal effort, lungs clear  ABDOMEN: soft, nontender, nondistended  MUSCULOSKELETAL: no deformity  NEURO: alert, moves all extremities, follows commands, cranial nerves II through XII intact.  Equal upper and lower extremity strength and sensation.  Normal finger-nose bilaterally.  Normal heel shin bilaterally.            MEDICATIONS GIVEN IN ER  Medications   sodium chloride 0.9 % flush 10 mL (has no administration in time range)   labetalol (NORMODYNE,TRANDATE) injection 40 mg (0 mg Intravenous Hold 7/14/25 8155)   nitroglycerin (NITROSTAT) SL tablet 0.4 mg (has no administration in time range)   sennosides-docusate (PERICOLACE) 8.6-50 MG per tablet 2 tablet (has no  administration in time range)     And   polyethylene glycol (MIRALAX) packet 17 g (has no administration in time range)     And   bisacodyl (DULCOLAX) EC tablet 5 mg (has no administration in time range)     And   bisacodyl (DULCOLAX) suppository 10 mg (has no administration in time range)   ondansetron ODT (ZOFRAN-ODT) disintegrating tablet 4 mg (has no administration in time range)     Or   ondansetron (ZOFRAN) injection 4 mg (has no administration in time range)   sodium chloride 0.9 % infusion (75 mL/hr Intravenous New Bag 7/15/25 0050)   acetaminophen (TYLENOL) tablet 650 mg (has no administration in time range)     Or   acetaminophen (TYLENOL) suppository 650 mg (has no administration in time range)   labetalol (NORMODYNE,TRANDATE) injection 10 mg (has no administration in time range)   aspirin tablet 325 mg (has no administration in time range)     Or   aspirin suppository 300 mg (has no administration in time range)   atorvastatin (LIPITOR) tablet 80 mg (80 mg Oral Given 7/15/25 0050)   iopamidol (ISOVUE-370) 76 % injection 100 mL (95 mL Intravenous Given by Other 7/14/25 2047)   labetalol (NORMODYNE,TRANDATE) injection 10 mg (10 mg Intravenous Given 7/14/25 2106)   labetalol (NORMODYNE,TRANDATE) injection 20 mg (20 mg Intravenous Given 7/14/25 2214)         ORDERS PLACED DURING THIS VISIT:  Orders Placed This Encounter   Procedures    CT Angiogram Head w AI Analysis of LVO    CT Angiogram Neck    XR Chest 1 View    MRI Brain Without Contrast    [place] Draw    Comprehensive Metabolic Panel    Protime-INR    aPTT    CBC Auto Differential    Basic Metabolic Panel    CBC (No Diff)    Hemoglobin A1c    Lipid Panel    TSH    NPO Diet NPO Type: Strict NPO    Perform NIH Stroke Scale    Measure Actual Weight    Notify Provider    Notify Provider for SBP Greater Than 140 for Hemorrhagic Stroke Patient    Head of Bed 30 Degrees or Less    Undress and Gown    Vital Signs    Neuro Checks    No Hypotonic Fluids     Nursing Dysphagia Screening (Complete Prior to Giving anything PO)    RN to Place Order SLP Consult (IF swallow screen failed) - Eval & Treat Choosing Reason of RN Dysphagia Screen Failed    Vital Signs    Telemetry - Place Orders & Notify Provider of Results When Patient Experiences Acute Chest Pain, Dysrhythmia or Respiratory Distress    May Be Off Telemetry for Tests    Daily Weights    Vital Signs    Maintain IV Access    Telemetry - Place Orders & Notify Provider of Results When Patient Experiences Acute Chest Pain, Dysrhythmia or Respiratory Distress    Notify physician of changes in level of consciousness, worsening of stroke symptoms, acute headache or severe nausea and vomiting or any of the following vital sign parameters:    Activity As Tolerated    Nursing Dysphagia Screening    RN to Place Order SLP Consult - Eval & Treat Choosing Reason of RN Dysphagia Screen Failed    Nurse to Call MD or Nutrition Services for Diet if Patient Passes Dysphagia Screen    Intake and Output    Neuro Checks    NIHSS Assessment    Place Sequential Compression Device    Maintain Sequential Compression Device    Continuous Pulse Oximetry    Up with assistance    Intake & Output    Oral Care    Order CT Head Without Contrast for Neurological Decline    Provide Stroke Education Material    Tobacco Cessation Education    Code Status and Medical Interventions: CPR (Attempt to Resuscitate); Full    LHA (on-call MD unless specified) Details    Notify Stroke Coordinator    Consult to Case Management     Inpatient Diabetes Educator Consult    Inpatient Neurology Consult Stroke    OT Consult: Eval & Treat    PT Consult: Eval & Treat as tolerated    Oxygen Therapy-    Incentive Spirometry    SLP Consult: Eval & Treat Communication Disorder    POC Glucose Once    POC Glucose Q6H    ECG 12 Lead Stroke Evaluation    ECG 12 Lead Rhythm Change    Adult transthoracic echo complete    Type & Screen    Insert Large-Bore  Peripheral IV - RIGHT AC Preferred    Inpatient Admission    Fall Precautions    CBC & Differential    Green Top (Gel)    Lavender Top    Gold Top - SST    Light Blue Top         PROCEDURES  Procedures            PROGRESS, DATA ANALYSIS, CONSULTS, AND MEDICAL DECISION MAKING  All labs have been independently interpreted by me.  All radiology studies have been reviewed by me. All EKG's have been independently viewed and interpreted by me.  Discussion below represents my analysis of pertinent findings related to patient's condition, differential diagnosis, treatment plan and final disposition.    Differential diagnosis includes but is not limited to stroke, TIA, seizure, migraine headache, intracranial hemorrhage.    Clinical Scores:                       Total (NIH Stroke Scale): 0               ED Course as of 07/15/25 0053   Mon Jul 14Jul 14, 2025   2027 I discussed the case with MD Sammie with Stroke Neurology at this time regarding the patient.  I discussed work-up, results, concerns.  I discussed the consulting provider's desire for obtaining stat CTA head and neck with subsequent admission to the observation unit for further stroke evaluation.  Notification for TNK or mechanical intervention at this time as the patient has no symptoms currently.   [DC]   2044 Hemoglobin: 15.8 [TR]   2044 EKG PROCEDURE    EKG time: 2027  Rhythm/Rate: Normal sinus, rate 75  P waves and WA: Normal P, normal WA  QRS, axis: Narrow QRS, normal axis  ST and T waves: No acute    Independently Interpreted by me  No prior EKG available for comparison   [TR]   2119 INR(!): 1.11 [TR]   2119 Potassium(!): 3.3 [TR]   2119 PTT: 26.3 [TR]   2119 BP(!): 219/104 [TR]   2119 XR Chest 1 View  My independent interpretation of the imaging study is no dense consolidation [TR]   2317 BP(!): 184/93 [DC]   2329 I discussed the case with CHILANGO Quiñones with [facility] at this time regarding the patient.  I discussed work-up, results, concerns.  I discussed the  consulting provider's desire for tele admit to MD Heydi.   [DC]      ED Course User Index  [DC] Tushar Boyd PA  [TR] Jeff Benitez MD       MDM: The patient symptoms at all resolved.  He currently has an NIH of 0.  We will obtain CTA head and neck and discussed with stroke neurology.  I expect he will require admission for further neurologic workup.      COMPLEXITY OF CARE  The patient requires admission.    Please note that portions of this document were completed with a voice recognition program.    Note Disclaimer: At [facility], we believe that sharing information builds trust and better relationships. You are receiving this note because you recently visited [facility]. It is possible you will see health information before a provider has talked with you about it. This kind of information can be easy to misunderstand. To help you fully understand what it means for your health, we urge you to discuss this note with your provider.         Jeff Benitez MD  07/15/25 0053

## 2025-07-15 NOTE — CONSULTS
"Neurology Consult Note    Consult Date: 7/15/2025    Referring MD: Ken Soliz MD    Reason for Consult I have been asked to see the patient in neurological consultation to render advice and opinion regarding right-sided weakness    Tushar Herron is a 75 y.o. male past medical history of hypertension, hypercholesterolemia, gout, prostate cancer presents to the ED with chief complaints of right face arm and leg numbness.    Patient states yesterday around 7 PM he was trying to go to bed when all of a sudden he developed right face arm and leg numbness without any weakness which resolved in a couple of hours patient denies any speech difficulties or vision difficulties    Past Medical History:   Diagnosis Date    Elevated cholesterol     Gout     Hypertension     Prostate cancer        Exam  /80 (BP Location: Right arm, Patient Position: Lying)   Pulse 71   Temp 97.7 °F (36.5 °C) (Oral)   Resp 18   Ht 190.5 cm (75\")   Wt 94.2 kg (207 lb 10.8 oz)   SpO2 99%   BMI 25.96 kg/m²   Gen: NAD, vitals reviewed  MS: oriented x3, recent/remote memory intact, normal attention/concentration, language intact, no neglect.  CN: visual acuity grossly normal, PERRL, EOMI, no facial droop, no dysarthria  Motor: 5/5 throughout upper and lower extremities, normal tone    DATA:    Lab Results   Component Value Date    GLUCOSE 99 07/15/2025    CALCIUM 9.5 07/15/2025     07/15/2025    K 3.6 07/15/2025    CO2 18.3 (L) 07/15/2025     07/15/2025    BUN 8.0 07/15/2025    CREATININE 0.62 (L) 07/15/2025    BCR 12.9 07/15/2025    ANIONGAP 12.7 07/15/2025     Lab Results   Component Value Date    WBC 9.33 07/15/2025    HGB 15.6 07/15/2025    HCT 47.3 07/15/2025    MCV 92.4 07/15/2025     07/15/2025   Vitals  Afebrile  Pulse rate 60-70  Respiratory rate 17-18  Systolic blood pressure 200s to 150    Lab review:   Sodium 137  Creatinine 0.62  Blood glucose 106  Normal LFTs    A1c 5.2  TSH 2.06  LDL 81    WBC " 9.33  Hemoglobin 15.6 and platelets 254    Imaging review:     CT head without acute hypodensity or hypodensity    CTA head and neck he has left P2 severe stenosis and intracranial atherosclerotic disease    MRI brain with diffusion restriction along the left thalamus and also left PCA territory        Diagnoses:  Acute ischemic  thalamic and left PCA stroke  Intracranial atherosclerotic disease  Hypertension  Mixed hyperlipidemia  History of prostate cancer  Gout    Pre-stroke MRS: 0  NIHSS: 0    Etiology of the stroke most likely due to intracranial atherosclerotic disease    Plan  Systolic blood pressure goal less than 150  Loaded with aspirin 325 and Plavix 300 mg one-time today  Tomorrow aspirin 81 and Plavix 75 mg daily for at least 3 to 6 months  Plavix response test pending tomorrow morning  Continue Lipitor 80 mg daily    Schedule him to clinic in September 2025.    We will continue to follow.      MDM   Reviewed: Previous charts, nursing notes and vitals   Reviewed: Previous labs and CT/CTA/MRI scan    Interpretation: Labs and CT/CTA/MRI scan   Total time providing care is : 40 minutes. This excluded time spent performing separately reportable procedures and services  Consults :Neurology/Stroke    Please note that portions of this note were completed with a voice recognition program.     Ryan Cochran MD  Neuro Hospitalist /Vascular Neurology.

## 2025-07-15 NOTE — PLAN OF CARE
Goal Outcome Evaluation:              Outcome Evaluation: (P) Patient seen for language evaluation. Pt presents with functional receptive and expressive language skills. SLP does not recommend therapy at this time. ST to s/o.                              None known

## 2025-07-15 NOTE — H&P
Patient Name:  Tushar Herron  YOB: 1950  MRN:  [id number]  Admit Date:  7/14/2025  Patient Care Team:  Daniel Billy MD as PCP - General (Family Medicine)      Subjective   History Present Illness     Chief Complaint   Patient presents with    Extremity Weakness       Mr. Herron is a 75 y.o. never-smoker with a history of pretension, hypercholesteremia, gout, prostate cancer who presents to [facility] with chief complaint of right face & arm numbness with associated dizziness and admitted for strokelike symptoms.    Right sided numbness started at 1910 and resolved en route to hospital.  Patient denies use of alcohol or tobacco.    Recommendation pending hospital course.  Details below.    History of Present Illness    Review of Systems   Constitutional:  Negative for chills and fever.   HENT:  Positive for rhinorrhea. Negative for congestion.    Respiratory:  Negative for cough and shortness of breath.    Cardiovascular:  Negative for chest pain and leg swelling.   Gastrointestinal:  Negative for abdominal pain, constipation, diarrhea, nausea and vomiting.   Endocrine: Negative for polydipsia, polyphagia and polyuria.   Genitourinary:  Negative for difficulty urinating and dysuria.   Musculoskeletal:  Negative for gait problem (resolved following resolution of dizziness) and myalgias.   Skin:  Negative for rash and wound.   Neurological:  Positive for dizziness (resolved) and numbness (right side numbness--resolved). Negative for syncope and speech difficulty.   Psychiatric/Behavioral:  Negative for confusion and hallucinations.         Personal History     Past Medical History:   Diagnosis Date    Elevated cholesterol     Gout     Hypertension     Prostate cancer      History reviewed. No pertinent surgical history.  Family History   Problem Relation Age of Onset    Hypertension Father      Social History     Tobacco Use    Smoking status: Never    Smokeless tobacco: Never   Vaping Use     Vaping status: Never Used   Substance Use Topics    Alcohol use: Yes    Drug use: Never     No current facility-administered medications on file prior to encounter.     Current Outpatient Medications on File Prior to Encounter   Medication Sig Dispense Refill    allopurinol (ZYLOPRIM) 100 MG tablet Take 1 tablet by mouth Daily.      atorvastatin (LIPITOR) 10 MG tablet Take 1 tablet by mouth Every Night.      lisinopril (PRINIVIL,ZESTRIL) 20 MG tablet Take 1 tablet by mouth Daily.       Allergies   Allergen Reactions    Penicillins Unknown - High Severity       Objective    Objective     Vital Signs  Temp:  [97.3 °F (36.3 °C)-97.8 °F (36.6 °C)] 97.7 °F (36.5 °C)  Heart Rate:  [62-78] 71  Resp:  [17-19] 18  BP: (152-223)/() 170/80  SpO2:  [96 %-99 %] 99 %  on   ;   Device (Oxygen Therapy): room air  Body mass index is 25.96 kg/m².    Physical Exam  Constitutional:       General: He is not in acute distress.     Appearance: He is not toxic-appearing.   HENT:      Head: Normocephalic and atraumatic.   Eyes:      Extraocular Movements: Extraocular movements intact.      Conjunctiva/sclera: Conjunctivae normal.   Cardiovascular:      Rate and Rhythm: Normal rate.      Heart sounds: Normal heart sounds.   Pulmonary:      Effort: Pulmonary effort is normal.      Breath sounds: Normal breath sounds.   Abdominal:      General: Bowel sounds are normal.      Palpations: Abdomen is soft.   Musculoskeletal:      Cervical back: Normal range of motion and neck supple.      Right lower leg: No edema.      Left lower leg: No edema.   Skin:     General: Skin is warm and dry.   Neurological:      General: No focal deficit present.      Mental Status: He is alert and oriented to person, place, and time. Mental status is at baseline.      Cranial Nerves: No cranial nerve deficit.      Sensory: No sensory deficit.      Motor: No weakness.   Psychiatric:         Mood and Affect: Mood normal.         Behavior: Behavior normal.          Thought Content: Thought content normal.         Results Review:  I reviewed the patient's new clinical results.  I reviewed the patient's new imaging results and agree with the interpretation.  I reviewed the patient's other test results and agree with the interpretation  I personally viewed and interpreted the patient's EKG/Telemetry data  Discussed with ED provider.    Lab Results (last 24 hours)       Procedure Component Value Units Date/Time    CBC & Differential [[id number]]  (Abnormal) Collected: 07/14/25 2033    Specimen: Blood Updated: 07/14/25 2043    Narrative:      The following orders were created for panel order CBC & Differential.  Procedure                               Abnormality         Status                     ---------                               -----------         ------                     CBC Auto Differential[[id number]]        Abnormal            Final result                 Please view results for these tests on the individual orders.    Comprehensive Metabolic Panel [[id number]]  (Abnormal) Collected: 07/14/25 2033    Specimen: Blood Updated: 07/14/25 2101     Glucose 106 mg/dL      BUN 10.0 mg/dL      Creatinine 0.80 mg/dL      Sodium 133 mmol/L      Potassium 3.3 mmol/L      Chloride 101 mmol/L      CO2 20.2 mmol/L      Calcium 9.5 mg/dL      Total Protein 7.3 g/dL      Albumin 4.2 g/dL      ALT (SGPT) 23 U/L      AST (SGOT) 21 U/L      Alkaline Phosphatase 100 U/L      Total Bilirubin 0.3 mg/dL      Globulin 3.1 gm/dL      A/G Ratio 1.4 g/dL      BUN/Creatinine Ratio 12.5     Anion Gap 11.8 mmol/L      eGFR 92.3 mL/min/1.73     Narrative:      GFR Categories in Chronic Kidney Disease (CKD)              GFR Category          GFR (mL/min/1.73)    Interpretation  G1                    90 or greater        Normal or high (1)  G2                    60-89                Mild decrease (1)  G3a                   45-59                Mild to moderate decrease  G3b                   30-44                 Moderate to severe decrease  G4                    15-29                Severe decrease  G5                    14 or less           Kidney failure    (1)In the absence of evidence of kidney disease, neither GFR category G1 or G2 fulfill the criteria for CKD.    eGFR calculation 2021 CKD-EPI creatinine equation, which does not include race as a factor    Protime-INR [[id number]]  (Abnormal) Collected: 07/14/25 2033    Specimen: Blood Updated: 07/14/25 2107     Protime 14.2 Seconds      INR 1.11    aPTT [[id number]]  (Normal) Collected: 07/14/25 2033    Specimen: Blood Updated: 07/14/25 2107     PTT 26.3 seconds     CBC Auto Differential [[id number]]  (Abnormal) Collected: 07/14/25 2033    Specimen: Blood Updated: 07/14/25 2043     WBC 7.73 10*3/mm3      RBC 5.26 10*6/mm3      Hemoglobin 15.8 g/dL      Hematocrit 50.4 %      MCV 95.8 fL      MCH 30.0 pg      MCHC 31.3 g/dL      RDW 13.2 %      RDW-SD 46.7 fl      MPV 9.9 fL      Platelets 233 10*3/mm3      Neutrophil % 63.9 %      Lymphocyte % 27.3 %      Monocyte % 6.5 %      Eosinophil % 1.4 %      Basophil % 0.3 %      Immature Grans % 0.6 %      Neutrophils, Absolute 4.94 10*3/mm3      Lymphocytes, Absolute 2.11 10*3/mm3      Monocytes, Absolute 0.50 10*3/mm3      Eosinophils, Absolute 0.11 10*3/mm3      Basophils, Absolute 0.02 10*3/mm3      Immature Grans, Absolute 0.05 10*3/mm3      nRBC 0.0 /100 WBC     POC Glucose Once [[id number]]  (Normal) Collected: 07/15/25 0133    Specimen: Blood Updated: 07/15/25 0135     Glucose 99 mg/dL     Basic Metabolic Panel [[id number]]  (Abnormal) Collected: 07/15/25 0449    Specimen: Blood Updated: 07/15/25 0543     Glucose 99 mg/dL      BUN 8.0 mg/dL      Creatinine 0.62 mg/dL      Sodium 137 mmol/L      Potassium 3.6 mmol/L      Chloride 106 mmol/L      CO2 18.3 mmol/L      Calcium 9.5 mg/dL      BUN/Creatinine Ratio 12.9     Anion Gap 12.7 mmol/L      eGFR 99.7 mL/min/1.73     Narrative:      GFR Categories in Chronic  Kidney Disease (CKD)              GFR Category          GFR (mL/min/1.73)    Interpretation  G1                    90 or greater        Normal or high (1)  G2                    60-89                Mild decrease (1)  G3a                   45-59                Mild to moderate decrease  G3b                   30-44                Moderate to severe decrease  G4                    15-29                Severe decrease  G5                    14 or less           Kidney failure    (1)In the absence of evidence of kidney disease, neither GFR category G1 or G2 fulfill the criteria for CKD.    eGFR calculation 2021 CKD-EPI creatinine equation, which does not include race as a factor    CBC (No Diff) [[id number]]  (Normal) Collected: 07/15/25 0449    Specimen: Blood Updated: 07/15/25 0524     WBC 9.33 10*3/mm3      RBC 5.12 10*6/mm3      Hemoglobin 15.6 g/dL      Hematocrit 47.3 %      MCV 92.4 fL      MCH 30.5 pg      MCHC 33.0 g/dL      RDW 12.4 %      RDW-SD 42.1 fl      MPV 10.2 fL      Platelets 254 10*3/mm3     Hemoglobin A1c [[id number]]  (Normal) Collected: 07/15/25 0449    Specimen: Blood Updated: 07/15/25 0536     Hemoglobin A1C 5.20 %     Narrative:      Hemoglobin A1C Ranges:    Increased Risk for Diabetes  5.7% to 6.4%  Diabetes                     >= 6.5%  Diabetic Goal                < 7.0%    Lipid Panel [[id number]] Collected: 07/15/25 0449    Specimen: Blood Updated: 07/15/25 0543     Total Cholesterol 154 mg/dL      Triglycerides 130 mg/dL      HDL Cholesterol 50 mg/dL      LDL Cholesterol  81 mg/dL      VLDL Cholesterol 23 mg/dL      LDL/HDL Ratio 1.56    Narrative:      Cholesterol Reference Ranges  (U.S. Department of Health and Human Services ATP III Classifications)    Desirable          <200 mg/dL  Borderline High    200-239 mg/dL  High Risk          >240 mg/dL      Triglyceride Reference Ranges  (U.S. Department of Health and Human Services ATP III Classifications)    Normal           <150  mg/dL  Borderline High  150-199 mg/dL  High             200-499 mg/dL  Very High        >500 mg/dL    HDL Reference Ranges  (U.S. Department of Health and Human Services ATP III Classifications)    Low     <40 mg/dl (major risk factor for CHD)  High    >60 mg/dl ('negative' risk factor for CHD)        LDL Reference Ranges  (U.S. Department of Health and Human Services ATP III Classifications)    Optimal          <100 mg/dL  Near Optimal     100-129 mg/dL  Borderline High  130-159 mg/dL  High             160-189 mg/dL  Very High        >189 mg/dL    LDL is calculated using the NIH LDL-C calculation.      TSH [[id number]]  (Normal) Collected: 07/15/25 0449    Specimen: Blood Updated: 07/15/25 0549     TSH 2.060 uIU/mL     POC Glucose Once [[id number]]  (Normal) Collected: 07/15/25 0621    Specimen: Blood Updated: 07/15/25 0623     Glucose 101 mg/dL             Imaging Results (Last 24 Hours)       Procedure Component Value Units Date/Time    MRI Brain Without Contrast [[id number]] Collected: 07/15/25 0906     Updated: 07/15/25 0911    Narrative:      MRI OF THE BRAIN WITHOUT CONTRAST ON 07/15/2025     CLINICAL HISTORY: This is a 75-year-old male patient with right-sided  weakness and numbness that has resolved. The patient has a history of  prostate cancer.     TECHNIQUE: Axial T1, FLAIR, fat-suppressed T2, axial diffusion and  gradient echo T2 and sagittal T1-weighted images were obtained of the  entire head.     This is correlated to a contrast-enhanced CT angiogram of the head and  neck yesterday evening on 07/14/2025 at 8:47 p.m.     FINDINGS: On the axial FLAIR and T2-weighted images, there are multiple  tiny patchy nodular foci of T2 high signal extending from the  periventricular into the subcortical white matter consistent with  mild-to-moderate small vessel disease. On the diffusion-weighted image  45 sequence 5, there is an ovoid 6 x 4 mm diffusion hyperintense focus  projecting over the inferior lateral left  thalamus that extends into the  medial fibers of the posterior limb of the left internal capsule that is  low signal on the ADC maps and is compatible with a tiny focus of  cytotoxic edema from a tiny acute lacunar-type infarct. Furthermore, on  the axial diffusion-weighted images, there is a tiny 3 mm diffusion  hyperintense focus in the lateral left occipital juxtacortical white  matter and a very tiny 2-3 mm focus in the posterior left occipital  white matter. It is suspicious for very subtle tiny acute left occipital  infarcts. The lateral left occipital white matter lesion may be in the  left MCA territory although the posterior left occipital lobe tiny acute  infarct may be in the left PCA territory. The remainder of the brain  parenchyma is normal in signal intensity. There is perhaps mild cerebral  atrophy. The ventricles are upper limits of normal in size. I see no  mass effect, no midline shift and no extra-axial fluid collections are  identified. On the gradient echo T2-weighted sequence, no acute or old  blood breakdown products are seen intracranially. Specifically, I see no  evidence of hemosiderin deposition in the brain or the extra-axial  spaces overlying the brain. The calvarium and skull base demonstrate  normal marrow signal intensity. There is mild inferior left maxillary  sinus mucosal thickening. Otherwise, the paranasal sinuses and the  mastoid air cells and the middle ear cavities are clear. The orbits are  normal in appearance. Good flow voids are demonstrated within the  cerebral vessels and in the dural venous sinuses.       Impression:      1. There is mild-to-moderate small vessel disease in the cerebral white  matter.     2. There is a 6 x 4 mm tiny acute infarct extending from the lateral  left thalamus into the medial fibers of the posterior limb of the left  internal capsule that may account for the patient's right-sided  weakness. Furthermore, there are 2 separate tiny 2-3 mm acute  infarcts  in the left occipital lobe, one in the lateral left occipital  juxtacortical white matter in the left MCA territory and one in the  posterior left occipital juxtacortical white matter that may be in the  left PCA territory. The remainder of the MRI of the brain is normal.      The results of this study were communicated to Dr. Cochran from Stroke  Neurology by telephone on 07/15/2025 at 8:15 AM.     This report was finalized on 7/15/2025 9:08 AM by Dr. Kilo Haines M.D  on Workstation: [id number]       CT Angiogram Head w AI Analysis of LVO [[id number]] Collected: 07/14/25 2119     Updated: 07/14/25 2133    Narrative:      HEAD CT WITHOUT CONTRAST, HEAD & NECK CTA WITH CONTRAST     INDICATION:  Right-sided weakness.     TECHNIQUE:  Axial images were acquired from the skull base to vertex without  contrast, including multiplanar reformats, per standard departmental  protocol , followed by CT angiogram of the head and neck with IV  contrast. 3-D postprocessing was performed and reviewed.  Evaluation for  a significant carotid arterial stenosis is based on the NASCET criteria.   Radiation dose reduction techniques were utilized, including automated  exposure control, and exposure modulation based on body size.     COMPARISON:  None available.     FINDINGS:     Head CT: There is no CT evidence of acute intracranial hemorrhage, mass,  or infarct. There is volume loss, but there is no evidence of  hydrocephalus or extra-axial fluid collection.  There are nonspecific  white matter changes, but there is no acute intracranial abnormality.     CTA neck: There is a normal aortic arch branching pattern without  proximal great vessel stenosis. Both vertebral arteries are patent  throughout the neck, and supply the basilar artery.  Both common carotid  arteries are normally patent. There is plaque at both cervical carotid  bifurcations, with 0% stenosis in both internal carotid arteries.     CTA head:  There is  symmetric distal intracranial runoff in the  anterior, middle, and posterior cerebral artery territories.  There is  no evidence of intracranial aneurysm, or of high-grade intracranial  flow-limiting stenosis.  There is no evidence of branch vessel  occlusion, or region or zone of hypoperfusion.  The dural venous sinuses  appear normal.     Extravascular structures: There is no intracranial mass or abnormal  enhancement.   The extracranial and cervical soft tissue structures show  no acute abnormality.  The visualized lung apices show no acute  abnormality.  There is no acute bony abnormality.       Impression:         Negative unenhanced head CT, no evidence of acute intracranial  abnormality. Mild senescent change.     There is plaque at both cervical carotid bifurcations, but 0% stenosis  in both internal carotid arteries. Both vertebral arteries are patent  throughout the neck.     Normal head CT angiogram, no acute intracranial vascular abnormality.     This report was finalized on 7/14/2025 9:30 PM by Dr. Antonio Fuentes M.D on Workstation: [id number]       CT Angiogram Neck [[id number]] Collected: 07/14/25 2119     Updated: 07/14/25 2133    Narrative:      HEAD CT WITHOUT CONTRAST, HEAD & NECK CTA WITH CONTRAST     INDICATION:  Right-sided weakness.     TECHNIQUE:  Axial images were acquired from the skull base to vertex without  contrast, including multiplanar reformats, per standard departmental  protocol , followed by CT angiogram of the head and neck with IV  contrast. 3-D postprocessing was performed and reviewed.  Evaluation for  a significant carotid arterial stenosis is based on the NASCET criteria.   Radiation dose reduction techniques were utilized, including automated  exposure control, and exposure modulation based on body size.     COMPARISON:  None available.     FINDINGS:     Head CT: There is no CT evidence of acute intracranial hemorrhage, mass,  or infarct. There is volume loss, but there  is no evidence of  hydrocephalus or extra-axial fluid collection.  There are nonspecific  white matter changes, but there is no acute intracranial abnormality.     CTA neck: There is a normal aortic arch branching pattern without  proximal great vessel stenosis. Both vertebral arteries are patent  throughout the neck, and supply the basilar artery.  Both common carotid  arteries are normally patent. There is plaque at both cervical carotid  bifurcations, with 0% stenosis in both internal carotid arteries.     CTA head:  There is symmetric distal intracranial runoff in the  anterior, middle, and posterior cerebral artery territories.  There is  no evidence of intracranial aneurysm, or of high-grade intracranial  flow-limiting stenosis.  There is no evidence of branch vessel  occlusion, or region or zone of hypoperfusion.  The dural venous sinuses  appear normal.     Extravascular structures: There is no intracranial mass or abnormal  enhancement.   The extracranial and cervical soft tissue structures show  no acute abnormality.  The visualized lung apices show no acute  abnormality.  There is no acute bony abnormality.       Impression:         Negative unenhanced head CT, no evidence of acute intracranial  abnormality. Mild senescent change.     There is plaque at both cervical carotid bifurcations, but 0% stenosis  in both internal carotid arteries. Both vertebral arteries are patent  throughout the neck.     Normal head CT angiogram, no acute intracranial vascular abnormality.     This report was finalized on 7/14/2025 9:30 PM by Dr. Antonio Fuentes M.D on Workstation: [id number]       XR Chest 1 View [[id number]] Collected: 07/14/25 2111     Updated: 07/14/25 2114    Narrative:      AP CHEST     HISTORY: Acute stroke protocol     COMPARISON: None available     FINDINGS: Lung fields are clear. Heart size within normal limits. No  evidence of pneumothorax. No acute bony abnormality       Impression:      No  active disease           This report was finalized on 7/14/2025 9:11 PM by Dr. Adrian Hinojosa M.D on Workstation: [id number]                   Telemetry Scan   Final Result      Telemetry Scan   Final Result      ECG 12 Lead Stroke Evaluation   Final Result   HEART RATE=75  bpm   RR Lgvvgjww=367  ms   AR Vudyaldv=310  ms   P Horizontal Axis=-22  deg   P Front Axis=32  deg   QRSD Svigefjr=795  ms   QT Yijqmbvg=564  ms   TWwQ=052  ms   QRS Axis=25  deg   T Wave Axis=31  deg   - NORMAL ECG -   Sinus rhythm   When compared with ECG of 07-May-2013 14:30:34,   No significant change   Electronically Signed By: Scotty Up ([facility]) 2025-07-15 08:55:25   Date and Time of Study:2025-07-14 20:27:15           Assessment/Plan     Active Hospital Problems    Diagnosis  POA    **Stroke-like symptoms [R29.90]  Yes    Hypokalemia [E87.6]  Yes    Hypertension [I10]  Yes    Hypercholesterolemia [E78.00]  Yes      Resolved Hospital Problems   No resolved problems to display.       Mr. Herron is a 75 y.o. non-smoker with a history of pretension, hypercholesteremia, gout, prostate cancer who presents to [facility] ER with chief complaint of right face & arm numbness with associated dizziness and admitted for strokelike symptoms.      Stroke-like symptoms  -MRI brain findings concerning for 6 x 4 mm tiny acute infarct from lateral left thalamus into left internal capsule +2 separate tidied 2 to 3 mm acute infarct in the left occipital lobe  -Unremarkable lipid panel, A1c 5.2, TSH 2.060  -statin therapy continued  -ASA ordered  -s/p Plavix loading dose  -TTE ordered  -IVF infusing  -therapies signed off  -P2Y12 pending      Hypertension  -BP markedly elevated on arrival & normalized s/p labetolol  -Permissive hypertension per neurology  -ACEi held for now      Hypercholesterolemia  -statin therapy continued given unremarkable livery enzymes      Hypokalemia  -normalized without replete  -repeat labs in a.m.      I discussed the patient's  findings and my recommendations with patient and nursing staff, Dr. Gonzalez.    VTE Prophylaxis - SCDs.  Code Status - Full code.       CLARK Peters  [facility]  07/15/25  12:36 EDT

## 2025-07-15 NOTE — PAYOR COMM NOTE
"RanjeetcandyYanira miles (75 y.o. Male)          INPATIENT REQUEST FOR [id number]    UR CONTACT MEL MCGHEE  P# [phone]  F# [phone]         Date of Birth   1950    Social Security Number       Address   [address] [place] [address] [address] [id number]    Home Phone   [phone]    MRN   [id number]       Islam   Gnosticism    Marital Status   Single                            Admission Date   7/14/2025    Admission Type   Emergency    Admitting Provider   Sharla Gonzalez MD    Attending Provider   Sharla Gonzalez MD    Department, Room/Bed   [address], P578/1       Discharge Date       Discharge Disposition       Discharge Destination                                 Attending Provider: Sharla Gonzalez MD    Allergies: Penicillins    Isolation: None   Infection: None   Code Status: CPR    Ht: 190.5 cm (75\")   Wt: 94.2 kg (207 lb 10.8 oz)    Admission Cmt: None   Principal Problem: Stroke-like symptoms [R29.90]                   Active Insurance as of 7/14/2025       Primary Coverage       Payor Plan Insurance Group Employer/Plan Group    ANTHEM BLUE CROSS ANTHEM BLUE CROSS BLUE SHIELD PPO [id number]       Payor Plan Address Payor Plan Phone Number Payor Plan Fax Number Effective Dates    [address] [phone]  1/1/2022 - None Entered    [facility] [id number]         Subscriber Name Subscriber Birth Date Member ID       YANIRA WRIGHT 1950 [id number]               Secondary Coverage       Payor Plan Insurance Group Employer/Plan Group    MEDICARE MEDICARE A ONLY        Payor Plan Address Payor Plan Phone Number Payor Plan Fax Number Effective Dates    [address] [phone]  2/1/2015 - None Entered    [facility] [id number]         Subscriber Name Subscriber Birth Date Member ID       YANIRA WRIGHT 1950 [id number]                     Emergency Contacts        (Rel.) Home Phone Work Phone Mobile Phone    CHLOE PEGUERO (Sister) [phone] -- " [phone]                 History & Physical        Yung Huston APRN at 07/15/25 0915              Patient Name:  Tushar Herron  YOB: 1950  MRN:  [id number]  Admit Date:  7/14/2025  Patient Care Team:  Daniel Billy MD as PCP - General (Family Medicine)      Subjective  History Present Illness     Chief Complaint   Patient presents with    Extremity Weakness       Mr. Herron is a 75 y.o. never-smoker with a history of pretension, hypercholesteremia, gout, prostate cancer who presents to [facility] ER with chief complaint of right face & arm numbness with associated dizziness and admitted for strokelike symptoms.    Right sided numbness started at 1910 and resolved en route to hospital.  Patient denies use of alcohol or tobacco.    Recommendation pending hospital course.  Details below.    History of Present Illness    Review of Systems   Constitutional:  Negative for chills and fever.   HENT:  Positive for rhinorrhea. Negative for congestion.    Respiratory:  Negative for cough and shortness of breath.    Cardiovascular:  Negative for chest pain and leg swelling.   Gastrointestinal:  Negative for abdominal pain, constipation, diarrhea, nausea and vomiting.   Endocrine: Negative for polydipsia, polyphagia and polyuria.   Genitourinary:  Negative for difficulty urinating and dysuria.   Musculoskeletal:  Negative for gait problem (resolved following resolution of dizziness) and myalgias.   Skin:  Negative for rash and wound.   Neurological:  Positive for dizziness (resolved) and numbness (right side numbness--resolved). Negative for syncope and speech difficulty.   Psychiatric/Behavioral:  Negative for confusion and hallucinations.         Personal History     Past Medical History:   Diagnosis Date    Elevated cholesterol     Gout     Hypertension     Prostate cancer      History reviewed. No pertinent surgical history.  Family History   Problem Relation Age of Onset    Hypertension Father       Social History     Tobacco Use    Smoking status: Never    Smokeless tobacco: Never   Vaping Use    Vaping status: Never Used   Substance Use Topics    Alcohol use: Yes    Drug use: Never     No current facility-administered medications on file prior to encounter.     Current Outpatient Medications on File Prior to Encounter   Medication Sig Dispense Refill    allopurinol (ZYLOPRIM) 100 MG tablet Take 1 tablet by mouth Daily.      atorvastatin (LIPITOR) 10 MG tablet Take 1 tablet by mouth Every Night.      lisinopril (PRINIVIL,ZESTRIL) 20 MG tablet Take 1 tablet by mouth Daily.       Allergies   Allergen Reactions    Penicillins Unknown - High Severity       Objective   Objective     Vital Signs  Temp:  [97.3 °F (36.3 °C)-97.8 °F (36.6 °C)] 97.7 °F (36.5 °C)  Heart Rate:  [62-78] 71  Resp:  [17-19] 18  BP: (152-223)/() 170/80  SpO2:  [96 %-99 %] 99 %  on   ;   Device (Oxygen Therapy): room air  Body mass index is 25.96 kg/m².    Physical Exam  Constitutional:       General: He is not in acute distress.     Appearance: He is not toxic-appearing.   HENT:      Head: Normocephalic and atraumatic.   Eyes:      Extraocular Movements: Extraocular movements intact.      Conjunctiva/sclera: Conjunctivae normal.   Cardiovascular:      Rate and Rhythm: Normal rate.      Heart sounds: Normal heart sounds.   Pulmonary:      Effort: Pulmonary effort is normal.      Breath sounds: Normal breath sounds.   Abdominal:      General: Bowel sounds are normal.      Palpations: Abdomen is soft.   Musculoskeletal:      Cervical back: Normal range of motion and neck supple.      Right lower leg: No edema.      Left lower leg: No edema.   Skin:     General: Skin is warm and dry.   Neurological:      General: No focal deficit present.      Mental Status: He is alert and oriented to person, place, and time. Mental status is at baseline.      Cranial Nerves: No cranial nerve deficit.      Sensory: No sensory deficit.      Motor: No  weakness.   Psychiatric:         Mood and Affect: Mood normal.         Behavior: Behavior normal.         Thought Content: Thought content normal.         Results Review:  I reviewed the patient's new clinical results.  I reviewed the patient's new imaging results and agree with the interpretation.  I reviewed the patient's other test results and agree with the interpretation  I personally viewed and interpreted the patient's EKG/Telemetry data  Discussed with ED provider.    Lab Results (last 24 hours)       Procedure Component Value Units Date/Time    CBC & Differential [[id number]]  (Abnormal) Collected: 07/14/25 2033    Specimen: Blood Updated: 07/14/25 2043    Narrative:      The following orders were created for panel order CBC & Differential.  Procedure                               Abnormality         Status                     ---------                               -----------         ------                     CBC Auto Differential[[id number]]        Abnormal            Final result                 Please view results for these tests on the individual orders.    Comprehensive Metabolic Panel [[id number]]  (Abnormal) Collected: 07/14/25 2033    Specimen: Blood Updated: 07/14/25 2101     Glucose 106 mg/dL      BUN 10.0 mg/dL      Creatinine 0.80 mg/dL      Sodium 133 mmol/L      Potassium 3.3 mmol/L      Chloride 101 mmol/L      CO2 20.2 mmol/L      Calcium 9.5 mg/dL      Total Protein 7.3 g/dL      Albumin 4.2 g/dL      ALT (SGPT) 23 U/L      AST (SGOT) 21 U/L      Alkaline Phosphatase 100 U/L      Total Bilirubin 0.3 mg/dL      Globulin 3.1 gm/dL      A/G Ratio 1.4 g/dL      BUN/Creatinine Ratio 12.5     Anion Gap 11.8 mmol/L      eGFR 92.3 mL/min/1.73     Narrative:      GFR Categories in Chronic Kidney Disease (CKD)              GFR Category          GFR (mL/min/1.73)    Interpretation  G1                    90 or greater        Normal or high (1)  G2                    60-89                Mild decrease  (1)  G3a                   45-59                Mild to moderate decrease  G3b                   30-44                Moderate to severe decrease  G4                    15-29                Severe decrease  G5                    14 or less           Kidney failure    (1)In the absence of evidence of kidney disease, neither GFR category G1 or G2 fulfill the criteria for CKD.    eGFR calculation 2021 CKD-EPI creatinine equation, which does not include race as a factor    Protime-INR [[id number]]  (Abnormal) Collected: 07/14/25 2033    Specimen: Blood Updated: 07/14/25 2107     Protime 14.2 Seconds      INR 1.11    aPTT [[id number]]  (Normal) Collected: 07/14/25 2033    Specimen: Blood Updated: 07/14/25 2107     PTT 26.3 seconds     CBC Auto Differential [[id number]]  (Abnormal) Collected: 07/14/25 2033    Specimen: Blood Updated: 07/14/25 2043     WBC 7.73 10*3/mm3      RBC 5.26 10*6/mm3      Hemoglobin 15.8 g/dL      Hematocrit 50.4 %      MCV 95.8 fL      MCH 30.0 pg      MCHC 31.3 g/dL      RDW 13.2 %      RDW-SD 46.7 fl      MPV 9.9 fL      Platelets 233 10*3/mm3      Neutrophil % 63.9 %      Lymphocyte % 27.3 %      Monocyte % 6.5 %      Eosinophil % 1.4 %      Basophil % 0.3 %      Immature Grans % 0.6 %      Neutrophils, Absolute 4.94 10*3/mm3      Lymphocytes, Absolute 2.11 10*3/mm3      Monocytes, Absolute 0.50 10*3/mm3      Eosinophils, Absolute 0.11 10*3/mm3      Basophils, Absolute 0.02 10*3/mm3      Immature Grans, Absolute 0.05 10*3/mm3      nRBC 0.0 /100 WBC     POC Glucose Once [[id number]]  (Normal) Collected: 07/15/25 0133    Specimen: Blood Updated: 07/15/25 0135     Glucose 99 mg/dL     Basic Metabolic Panel [[id number]]  (Abnormal) Collected: 07/15/25 0449    Specimen: Blood Updated: 07/15/25 0543     Glucose 99 mg/dL      BUN 8.0 mg/dL      Creatinine 0.62 mg/dL      Sodium 137 mmol/L      Potassium 3.6 mmol/L      Chloride 106 mmol/L      CO2 18.3 mmol/L      Calcium 9.5 mg/dL      BUN/Creatinine  Ratio 12.9     Anion Gap 12.7 mmol/L      eGFR 99.7 mL/min/1.73     Narrative:      GFR Categories in Chronic Kidney Disease (CKD)              GFR Category          GFR (mL/min/1.73)    Interpretation  G1                    90 or greater        Normal or high (1)  G2                    60-89                Mild decrease (1)  G3a                   45-59                Mild to moderate decrease  G3b                   30-44                Moderate to severe decrease  G4                    15-29                Severe decrease  G5                    14 or less           Kidney failure    (1)In the absence of evidence of kidney disease, neither GFR category G1 or G2 fulfill the criteria for CKD.    eGFR calculation 2021 CKD-EPI creatinine equation, which does not include race as a factor    CBC (No Diff) [[id number]]  (Normal) Collected: 07/15/25 0449    Specimen: Blood Updated: 07/15/25 0524     WBC 9.33 10*3/mm3      RBC 5.12 10*6/mm3      Hemoglobin 15.6 g/dL      Hematocrit 47.3 %      MCV 92.4 fL      MCH 30.5 pg      MCHC 33.0 g/dL      RDW 12.4 %      RDW-SD 42.1 fl      MPV 10.2 fL      Platelets 254 10*3/mm3     Hemoglobin A1c [[id number]]  (Normal) Collected: 07/15/25 0449    Specimen: Blood Updated: 07/15/25 0536     Hemoglobin A1C 5.20 %     Narrative:      Hemoglobin A1C Ranges:    Increased Risk for Diabetes  5.7% to 6.4%  Diabetes                     >= 6.5%  Diabetic Goal                < 7.0%    Lipid Panel [[id number]] Collected: 07/15/25 0449    Specimen: Blood Updated: 07/15/25 0543     Total Cholesterol 154 mg/dL      Triglycerides 130 mg/dL      HDL Cholesterol 50 mg/dL      LDL Cholesterol  81 mg/dL      VLDL Cholesterol 23 mg/dL      LDL/HDL Ratio 1.56    Narrative:      Cholesterol Reference Ranges  (U.S. Department of Health and Human Services ATP III Classifications)    Desirable          <200 mg/dL  Borderline High    200-239 mg/dL  High Risk          >240 mg/dL      Triglyceride Reference  Ranges  (U.S. Department of Health and Human Services ATP III Classifications)    Normal           <150 mg/dL  Borderline High  150-199 mg/dL  High             200-499 mg/dL  Very High        >500 mg/dL    HDL Reference Ranges  (U.S. Department of Health and Human Services ATP III Classifications)    Low     <40 mg/dl (major risk factor for CHD)  High    >60 mg/dl ('negative' risk factor for CHD)        LDL Reference Ranges  (U.S. Department of Health and Human Services ATP III Classifications)    Optimal          <100 mg/dL  Near Optimal     100-129 mg/dL  Borderline High  130-159 mg/dL  High             160-189 mg/dL  Very High        >189 mg/dL    LDL is calculated using the NIH LDL-C calculation.      TSH [[id number]]  (Normal) Collected: 07/15/25 0449    Specimen: Blood Updated: 07/15/25 0549     TSH 2.060 uIU/mL     POC Glucose Once [[id number]]  (Normal) Collected: 07/15/25 0621    Specimen: Blood Updated: 07/15/25 0623     Glucose 101 mg/dL             Imaging Results (Last 24 Hours)       Procedure Component Value Units Date/Time    MRI Brain Without Contrast [[id number]] Collected: 07/15/25 0906     Updated: 07/15/25 0911    Narrative:      MRI OF THE BRAIN WITHOUT CONTRAST ON 07/15/2025     CLINICAL HISTORY: This is a 75-year-old male patient with right-sided  weakness and numbness that has resolved. The patient has a history of  prostate cancer.     TECHNIQUE: Axial T1, FLAIR, fat-suppressed T2, axial diffusion and  gradient echo T2 and sagittal T1-weighted images were obtained of the  entire head.     This is correlated to a contrast-enhanced CT angiogram of the head and  neck yesterday evening on 07/14/2025 at 8:47 p.m.     FINDINGS: On the axial FLAIR and T2-weighted images, there are multiple  tiny patchy nodular foci of T2 high signal extending from the  periventricular into the subcortical white matter consistent with  mild-to-moderate small vessel disease. On the diffusion-weighted image  45 sequence  5, there is an ovoid 6 x 4 mm diffusion hyperintense focus  projecting over the inferior lateral left thalamus that extends into the  medial fibers of the posterior limb of the left internal capsule that is  low signal on the ADC maps and is compatible with a tiny focus of  cytotoxic edema from a tiny acute lacunar-type infarct. Furthermore, on  the axial diffusion-weighted images, there is a tiny 3 mm diffusion  hyperintense focus in the lateral left occipital juxtacortical white  matter and a very tiny 2-3 mm focus in the posterior left occipital  white matter. It is suspicious for very subtle tiny acute left occipital  infarcts. The lateral left occipital white matter lesion may be in the  left MCA territory although the posterior left occipital lobe tiny acute  infarct may be in the left PCA territory. The remainder of the brain  parenchyma is normal in signal intensity. There is perhaps mild cerebral  atrophy. The ventricles are upper limits of normal in size. I see no  mass effect, no midline shift and no extra-axial fluid collections are  identified. On the gradient echo T2-weighted sequence, no acute or old  blood breakdown products are seen intracranially. Specifically, I see no  evidence of hemosiderin deposition in the brain or the extra-axial  spaces overlying the brain. The calvarium and skull base demonstrate  normal marrow signal intensity. There is mild inferior left maxillary  sinus mucosal thickening. Otherwise, the paranasal sinuses and the  mastoid air cells and the middle ear cavities are clear. The orbits are  normal in appearance. Good flow voids are demonstrated within the  cerebral vessels and in the dural venous sinuses.       Impression:      1. There is mild-to-moderate small vessel disease in the cerebral white  matter.     2. There is a 6 x 4 mm tiny acute infarct extending from the lateral  left thalamus into the medial fibers of the posterior limb of the left  internal capsule that  may account for the patient's right-sided  weakness. Furthermore, there are 2 separate tiny 2-3 mm acute infarcts  in the left occipital lobe, one in the lateral left occipital  juxtacortical white matter in the left MCA territory and one in the  posterior left occipital juxtacortical white matter that may be in the  left PCA territory. The remainder of the MRI of the brain is normal.      The results of this study were communicated to Dr. Cochran from Stroke  Neurology by telephone on 07/15/2025 at 8:15 AM.     This report was finalized on 7/15/2025 9:08 AM by Dr. Kilo Haines M.D  on Workstation: [id number]       CT Angiogram Head w AI Analysis of LVO [[id number]] Collected: 07/14/25 2119     Updated: 07/14/25 2133    Narrative:      HEAD CT WITHOUT CONTRAST, HEAD & NECK CTA WITH CONTRAST     INDICATION:  Right-sided weakness.     TECHNIQUE:  Axial images were acquired from the skull base to vertex without  contrast, including multiplanar reformats, per standard departmental  protocol , followed by CT angiogram of the head and neck with IV  contrast. 3-D postprocessing was performed and reviewed.  Evaluation for  a significant carotid arterial stenosis is based on the NASCET criteria.   Radiation dose reduction techniques were utilized, including automated  exposure control, and exposure modulation based on body size.     COMPARISON:  None available.     FINDINGS:     Head CT: There is no CT evidence of acute intracranial hemorrhage, mass,  or infarct. There is volume loss, but there is no evidence of  hydrocephalus or extra-axial fluid collection.  There are nonspecific  white matter changes, but there is no acute intracranial abnormality.     CTA neck: There is a normal aortic arch branching pattern without  proximal great vessel stenosis. Both vertebral arteries are patent  throughout the neck, and supply the basilar artery.  Both common carotid  arteries are normally patent. There is plaque at both  cervical carotid  bifurcations, with 0% stenosis in both internal carotid arteries.     CTA head:  There is symmetric distal intracranial runoff in the  anterior, middle, and posterior cerebral artery territories.  There is  no evidence of intracranial aneurysm, or of high-grade intracranial  flow-limiting stenosis.  There is no evidence of branch vessel  occlusion, or region or zone of hypoperfusion.  The dural venous sinuses  appear normal.     Extravascular structures: There is no intracranial mass or abnormal  enhancement.   The extracranial and cervical soft tissue structures show  no acute abnormality.  The visualized lung apices show no acute  abnormality.  There is no acute bony abnormality.       Impression:         Negative unenhanced head CT, no evidence of acute intracranial  abnormality. Mild senescent change.     There is plaque at both cervical carotid bifurcations, but 0% stenosis  in both internal carotid arteries. Both vertebral arteries are patent  throughout the neck.     Normal head CT angiogram, no acute intracranial vascular abnormality.     This report was finalized on 7/14/2025 9:30 PM by Dr. Antonio Fuentes M.D on Workstation: [id number]       CT Angiogram Neck [[id number]] Collected: 07/14/25 2119     Updated: 07/14/25 2133    Narrative:      HEAD CT WITHOUT CONTRAST, HEAD & NECK CTA WITH CONTRAST     INDICATION:  Right-sided weakness.     TECHNIQUE:  Axial images were acquired from the skull base to vertex without  contrast, including multiplanar reformats, per standard departmental  protocol , followed by CT angiogram of the head and neck with IV  contrast. 3-D postprocessing was performed and reviewed.  Evaluation for  a significant carotid arterial stenosis is based on the NASCET criteria.   Radiation dose reduction techniques were utilized, including automated  exposure control, and exposure modulation based on body size.     COMPARISON:  None available.     FINDINGS:     Head CT:  There is no CT evidence of acute intracranial hemorrhage, mass,  or infarct. There is volume loss, but there is no evidence of  hydrocephalus or extra-axial fluid collection.  There are nonspecific  white matter changes, but there is no acute intracranial abnormality.     CTA neck: There is a normal aortic arch branching pattern without  proximal great vessel stenosis. Both vertebral arteries are patent  throughout the neck, and supply the basilar artery.  Both common carotid  arteries are normally patent. There is plaque at both cervical carotid  bifurcations, with 0% stenosis in both internal carotid arteries.     CTA head:  There is symmetric distal intracranial runoff in the  anterior, middle, and posterior cerebral artery territories.  There is  no evidence of intracranial aneurysm, or of high-grade intracranial  flow-limiting stenosis.  There is no evidence of branch vessel  occlusion, or region or zone of hypoperfusion.  The dural venous sinuses  appear normal.     Extravascular structures: There is no intracranial mass or abnormal  enhancement.   The extracranial and cervical soft tissue structures show  no acute abnormality.  The visualized lung apices show no acute  abnormality.  There is no acute bony abnormality.       Impression:         Negative unenhanced head CT, no evidence of acute intracranial  abnormality. Mild senescent change.     There is plaque at both cervical carotid bifurcations, but 0% stenosis  in both internal carotid arteries. Both vertebral arteries are patent  throughout the neck.     Normal head CT angiogram, no acute intracranial vascular abnormality.     This report was finalized on 7/14/2025 9:30 PM by Dr. Antonio Fuentes M.D on Workstation: [id number]       XR Chest 1 View [[id number]] Collected: 07/14/25 2111     Updated: 07/14/25 2114    Narrative:      AP CHEST     HISTORY: Acute stroke protocol     COMPARISON: None available     FINDINGS: Lung fields are clear. Heart size  within normal limits. No  evidence of pneumothorax. No acute bony abnormality       Impression:      No active disease           This report was finalized on 7/14/2025 9:11 PM by Dr. Adrian Hinojosa M.D on Workstation: [id number]                   Telemetry Scan   Final Result      Telemetry Scan   Final Result      ECG 12 Lead Stroke Evaluation   Final Result   HEART RATE=75  bpm   RR Nwpzxzhl=170  ms   NV Ijxpvfsw=069  ms   P Horizontal Axis=-22  deg   P Front Axis=32  deg   QRSD Itruiglb=052  ms   QT Nshodrtq=813  ms   CTiF=997  ms   QRS Axis=25  deg   T Wave Axis=31  deg   - NORMAL ECG -   Sinus rhythm   When compared with ECG of 07-May-2013 14:30:34,   No significant change   Electronically Signed By: Scotty Up ([facility]) 2025-07-15 08:55:25   Date and Time of Study:2025-07-14 20:27:15           Assessment/Plan     Active Hospital Problems    Diagnosis  POA    **Stroke-like symptoms [R29.90]  Yes    Hypokalemia [E87.6]  Yes    Hypertension [I10]  Yes    Hypercholesterolemia [E78.00]  Yes      Resolved Hospital Problems   No resolved problems to display.       Mr. Herron is a 75 y.o. non-smoker with a history of pretension, hypercholesteremia, gout, prostate cancer who presents to [facility] ER with chief complaint of right face & arm numbness with associated dizziness and admitted for strokelike symptoms.      Stroke-like symptoms  -MRI brain findings concerning for 6 x 4 mm tiny acute infarct from lateral left thalamus into left internal capsule +2 separate tidied 2 to 3 mm acute infarct in the left occipital lobe  -Unremarkable lipid panel, A1c 5.2, TSH 2.060  -statin therapy continued  -ASA ordered  -s/p Plavix loading dose  -TTE ordered  -IVF infusing  -therapies signed off  -P2Y12 pending      Hypertension  -BP markedly elevated on arrival & normalized s/p labetolol  -Permissive hypertension per neurology  -ACEi held for now      Hypercholesterolemia  -statin therapy continued given unremarkable livery  enzymes      Hypokalemia  -normalized without replete  -repeat labs in a.m.      I discussed the patient's findings and my recommendations with patient and nursing staff, Dr. Gonzalez.    VTE Prophylaxis - SCDs.  Code Status - Full code.       CLARK Peters  [facility]  07/15/25  12:36 EDT      Electronically signed by Yung Huston APRN at 07/15/25 1236          Emergency Department Notes        Edison Fernandes RN at 07/15/25 0052          Nursing report ED to floor  Tushar Herron  75 y.o.  male    HPI :  HPI  Stated Reason for Visit: pt arrived for right sided weakness. [id number]  History Obtained From: EMS    Chief Complaint  Chief Complaint   Patient presents with    Extremity Weakness       Admitting doctor:   Gokul Soliz MD    Admitting diagnosis:   The primary encounter diagnosis was Stroke-like symptom. Diagnoses of Right sided numbness and Hypertensive crisis were also pertinent to this visit.    Code status:   Current Code Status       Date Active Code Status Order ID Comments User Context       7/14/2025 2340 CPR (Attempt to Resuscitate) [id number]  Sussy Avila APRN ED        Question Answer    Code Status (Patient has no pulse and is not breathing) CPR (Attempt to Resuscitate)    Medical Interventions (Patient has pulse or is breathing) Full                    Allergies:   Penicillins    Isolation:   No active isolations    Intake and Output  No intake or output data in the 24 hours ending 07/15/25 0052    Weight:       07/14/25 2024   Weight: 99.7 kg (219 lb 12.8 oz)       Most recent vitals:   Vitals:    07/14/25 2214 07/14/25 2216 07/14/25 2314 07/15/25 0031   BP: (!) 223/108 (!) 215/99 (!) 184/93 152/79   Pulse: 65 65 63 67   Resp:    17   Temp:       TempSrc:       SpO2:  99% 97% 98%   Weight:       Height:           Active LDAs/IV Access:   Lines, Drains & Airways       Active LDAs       Name Placement date Placement time Site Days    Peripheral IV 07/14/25  2022 18 G Left Antecubital 07/14/25 2022  Antecubital  less than 1    Peripheral IV 07/14/25 2026 18 G Right Antecubital 07/14/25 2026  Antecubital  less than 1                    Labs (abnormal labs have a star):   Labs Reviewed   COMPREHENSIVE METABOLIC PANEL - Abnormal; Notable for the following components:       Result Value    Glucose 106 (*)     Sodium 133 (*)     Potassium 3.3 (*)     CO2 20.2 (*)     All other components within normal limits    Narrative:     GFR Categories in Chronic Kidney Disease (CKD)              GFR Category          GFR (mL/min/1.73)    Interpretation  G1                    90 or greater        Normal or high (1)  G2                    60-89                Mild decrease (1)  G3a                   45-59                Mild to moderate decrease  G3b                   30-44                Moderate to severe decrease  G4                    15-29                Severe decrease  G5                    14 or less           Kidney failure    (1)In the absence of evidence of kidney disease, neither GFR category G1 or G2 fulfill the criteria for CKD.    eGFR calculation 2021 CKD-EPI creatinine equation, which does not include race as a factor   PROTIME-INR - Abnormal; Notable for the following components:    INR 1.11 (*)     All other components within normal limits   CBC WITH AUTO DIFFERENTIAL - Abnormal; Notable for the following components:    MCHC 31.3 (*)     Immature Grans % 0.6 (*)     All other components within normal limits   APTT - Normal   RAINBOW DRAW    Narrative:     The following orders were created for panel order [place] Draw.  Procedure                               Abnormality         Status                     ---------                               -----------         ------                     Green Top (Gel)[[id number]]                                  Final result               Lavender Top[[id number]]                                     Final result               Gold  Top - SST[[id number]]                                   Final result               Light Blue Top[[id number]]                                   Final result                 Please view results for these tests on the individual orders.   BASIC METABOLIC PANEL   CBC (NO DIFF)   HEMOGLOBIN A1C   LIPID PANEL   TSH   POCT GLUCOSE FINGERSTICK   POCT GLUCOSE FINGERSTICK   POCT GLUCOSE FINGERSTICK   POCT GLUCOSE FINGERSTICK   POCT GLUCOSE FINGERSTICK   POCT GLUCOSE FINGERSTICK   TYPE AND SCREEN   CBC AND DIFFERENTIAL    Narrative:     The following orders were created for panel order CBC & Differential.  Procedure                               Abnormality         Status                     ---------                               -----------         ------                     CBC Auto Differential[[id number]]        Abnormal            Final result                 Please view results for these tests on the individual orders.   GREEN TOP   LAVENDER TOP   GOLD TOP - [facility]   LIGHT BLUE TOP       EKG:   ECG 12 Lead Stroke Evaluation   Preliminary Result   HEART RATE=75  bpm   RR Ozquycnm=108  ms   MD Qwddskxb=930  ms   P Horizontal Axis=-22  deg   P Front Axis=32  deg   QRSD Hvsmopzg=693  ms   QT Jrcbsnlw=977  ms   PTuE=386  ms   QRS Axis=25  deg   T Wave Axis=31  deg   - NORMAL ECG -   Sinus rhythm   When compared with ECG of 07-May-2013 14:30:34,   No significant change   Date and Time of Study:2025-07-14 20:27:15          Meds given in ED:   Medications   sodium chloride 0.9 % flush 10 mL (has no administration in time range)   labetalol (NORMODYNE,TRANDATE) injection 40 mg (0 mg Intravenous Hold 7/14/25 1328)   nitroglycerin (NITROSTAT) SL tablet 0.4 mg (has no administration in time range)   sennosides-docusate (PERICOLACE) 8.6-50 MG per tablet 2 tablet (has no administration in time range)     And   polyethylene glycol (MIRALAX) packet 17 g (has no administration in time range)     And   bisacodyl (DULCOLAX) EC tablet 5 mg (has  no administration in time range)     And   bisacodyl (DULCOLAX) suppository 10 mg (has no administration in time range)   ondansetron ODT (ZOFRAN-ODT) disintegrating tablet 4 mg (has no administration in time range)     Or   ondansetron (ZOFRAN) injection 4 mg (has no administration in time range)   sodium chloride 0.9 % infusion (75 mL/hr Intravenous New Bag 7/15/25 0050)   acetaminophen (TYLENOL) tablet 650 mg (has no administration in time range)     Or   acetaminophen (TYLENOL) suppository 650 mg (has no administration in time range)   labetalol (NORMODYNE,TRANDATE) injection 10 mg (has no administration in time range)   aspirin tablet 325 mg (has no administration in time range)     Or   aspirin suppository 300 mg (has no administration in time range)   atorvastatin (LIPITOR) tablet 80 mg (80 mg Oral Given 7/15/25 0050)   iopamidol (ISOVUE-370) 76 % injection 100 mL (95 mL Intravenous Given by Other 7/14/25 2047)   labetalol (NORMODYNE,TRANDATE) injection 10 mg (10 mg Intravenous Given 7/14/25 2106)   labetalol (NORMODYNE,TRANDATE) injection 20 mg (20 mg Intravenous Given 7/14/25 2214)       Imaging results:  CT Angiogram Head w AI Analysis of LVO  Result Date: 7/14/2025   Negative unenhanced head CT, no evidence of acute intracranial abnormality. Mild senescent change.  There is plaque at both cervical carotid bifurcations, but 0% stenosis in both internal carotid arteries. Both vertebral arteries are patent throughout the neck.  Normal head CT angiogram, no acute intracranial vascular abnormality.  This report was finalized on 7/14/2025 9:30 PM by Dr. Antonio Fuentes M.D on Workstation: [id number]      CT Angiogram Neck  Result Date: 7/14/2025   Negative unenhanced head CT, no evidence of acute intracranial abnormality. Mild senescent change.  There is plaque at both cervical carotid bifurcations, but 0% stenosis in both internal carotid arteries. Both vertebral arteries are patent throughout the neck.   Normal head CT angiogram, no acute intracranial vascular abnormality.  This report was finalized on 7/14/2025 9:30 PM by Dr. Antonio Fuentes M.D on Workstation: [id number]      XR Chest 1 View  Result Date: 7/14/2025  No active disease    This report was finalized on 7/14/2025 9:11 PM by Dr. Adrian Hinojosa M.D on Workstation: [id number]        Ambulatory status:   -assist x1    Social issues:   Social History     Socioeconomic History    Marital status: Single       Peripheral Neurovascular  Peripheral Neurovascular (Adult)  Peripheral Neurovascular WDL: WDL, capillary refill  Capillary Refill, General: less than/equal to 3 secs    Neuro Cognitive  Neuro Cognitive (Adult)  Cognitive/Neuro/Behavioral WDL: WDL  Level of Consciousness: Alert  Orientation: oriented x 4  Pupils  Pupil PERRLA: yes  Zonia Coma Scale  Best Eye Response: 4-->(E4) spontaneous  Best Motor Response: 6-->(M6) obeys commands  Best Verbal Response: 5-->(V5) oriented  Zonia Coma Scale Score: 15  NIH Stroke Scale  1a. Level of Consciousness: 0-->Alert, keenly responsive  1b. LOC Questions: 0-->Answers both questions correctly  1c. LOC Commands: 0-->Performs both tasks correctly  2. Best Gaze: 0-->Normal  3. Visual: 0-->No visual loss  4. Facial Palsy: 0-->Normal symmetrical movements  5a. Motor Arm, Left: 0-->No drift, limb holds 90 (or 45) degrees for full 10 secs  5b. Motor Arm, Right: 0-->No drift, limb holds 90 (or 45) degrees for full 10 secs  6a. Motor Leg, Left: 0-->No drift, leg holds 30 degree position for full 5 secs  6b. Motor Leg, Right: 0-->No drift, leg holds 30 degree position for full 5 secs  7. Limb Ataxia: 0-->Absent  8. Sensory: 0-->Normal, no sensory loss  9. Best Language: 0-->No aphasia, normal  10. Dysarthria: 0-->Normal  11. Extinction and Inattention (formerly Neglect): 0-->No abnormality  Total (NIH Stroke Scale): 0    Learning  Learning Assessment  Learning Readiness and Ability: no barriers  identified  Education Provided  Person Taught: patient    Respiratory  Respiratory WDL  Respiratory WDL: WDL    Abdominal Pain  Abdominal Pain CPG Interventions  Coping Interventions: anticipatory guidance provided, care explained to patient/family prior to performing  Safety Interventions  Safety Precautions/Falls Reduction: assistive device/personal items within reach  All Alarms: alarm(s) activated and audible    Pain Assessments  Pain (Adult)  (0-10) Pain Rating: Rest: 0    NIH Stroke Scale  NIH Stroke Scale  1a. Level of Consciousness: 0-->Alert, keenly responsive  1b. LOC Questions: 0-->Answers both questions correctly  1c. LOC Commands: 0-->Performs both tasks correctly  2. Best Gaze: 0-->Normal  3. Visual: 0-->No visual loss  4. Facial Palsy: 0-->Normal symmetrical movements  5a. Motor Arm, Left: 0-->No drift, limb holds 90 (or 45) degrees for full 10 secs  5b. Motor Arm, Right: 0-->No drift, limb holds 90 (or 45) degrees for full 10 secs  6a. Motor Leg, Left: 0-->No drift, leg holds 30 degree position for full 5 secs  6b. Motor Leg, Right: 0-->No drift, leg holds 30 degree position for full 5 secs  7. Limb Ataxia: 0-->Absent  8. Sensory: 0-->Normal, no sensory loss  9. Best Language: 0-->No aphasia, normal  10. Dysarthria: 0-->Normal  11. Extinction and Inattention (formerly Neglect): 0-->No abnormality  Total (NIH Stroke Scale): 0    Edison Fernandes RN  07/15/25 00:52 EDT     Electronically signed by Edison Fernandes RN at 07/15/25 0053       Jeff Benitez MD at 07/14/25 2032           EMERGENCY DEPARTMENT MD ATTESTATION NOTE    Room Number:  04/04  PCP: Daniel Billy MD  Independent Historians: Patient and EMS    HPI:      Context: Tushar Herron is a 75 y.o. male with a medical history of hypertension, hypercholesterolemia who presents to the ED c/o acute right sided weakness and numbness.  The patient reports that he developed right sided facial arm and leg altered sensation.  EMS noted right sided  weakness.  All of his symptoms resolved on the way to the hospital.  He denies prior similar episodes.  He was brought to room 4 by EMS and I evaluated him upon arrival.        Review of prior external notes (non-ED) -and- Review of prior external test results outside of this encounter: Chest x-ray 8 7/22/2024 showed no acute process    Prescription drug monitoring program review:           PHYSICAL EXAM    I have reviewed the triage vital signs and nursing notes.    ED Triage Vitals [07/14/25 2023]   Temp Heart Rate Resp BP SpO2   -- 78 19 (!) 217/106 96 %      Temp src Heart Rate Source Patient Position BP Location FiO2 (%)   -- Monitor -- -- --       Physical Exam  GENERAL: Awake, alert, no acute distress  SKIN: Warm, dry  HENT: Normocephalic, atraumatic  EYES: no scleral icterus  CV: regular rhythm, regular rate  RESPIRATORY: normal effort, lungs clear  ABDOMEN: soft, nontender, nondistended  MUSCULOSKELETAL: no deformity  NEURO: alert, moves all extremities, follows commands, cranial nerves II through XII intact.  Equal upper and lower extremity strength and sensation.  Normal finger-nose bilaterally.  Normal heel shin bilaterally.            MEDICATIONS GIVEN IN ER  Medications   sodium chloride 0.9 % flush 10 mL (has no administration in time range)   labetalol (NORMODYNE,TRANDATE) injection 40 mg (0 mg Intravenous Hold 7/14/25 2316)   nitroglycerin (NITROSTAT) SL tablet 0.4 mg (has no administration in time range)   sennosides-docusate (PERICOLACE) 8.6-50 MG per tablet 2 tablet (has no administration in time range)     And   polyethylene glycol (MIRALAX) packet 17 g (has no administration in time range)     And   bisacodyl (DULCOLAX) EC tablet 5 mg (has no administration in time range)     And   bisacodyl (DULCOLAX) suppository 10 mg (has no administration in time range)   ondansetron ODT (ZOFRAN-ODT) disintegrating tablet 4 mg (has no administration in time range)     Or   ondansetron (ZOFRAN) injection 4  mg (has no administration in time range)   sodium chloride 0.9 % infusion (75 mL/hr Intravenous New Bag 7/15/25 0050)   acetaminophen (TYLENOL) tablet 650 mg (has no administration in time range)     Or   acetaminophen (TYLENOL) suppository 650 mg (has no administration in time range)   labetalol (NORMODYNE,TRANDATE) injection 10 mg (has no administration in time range)   aspirin tablet 325 mg (has no administration in time range)     Or   aspirin suppository 300 mg (has no administration in time range)   atorvastatin (LIPITOR) tablet 80 mg (80 mg Oral Given 7/15/25 0050)   iopamidol (ISOVUE-370) 76 % injection 100 mL (95 mL Intravenous Given by Other 7/14/25 2047)   labetalol (NORMODYNE,TRANDATE) injection 10 mg (10 mg Intravenous Given 7/14/25 2106)   labetalol (NORMODYNE,TRANDATE) injection 20 mg (20 mg Intravenous Given 7/14/25 2214)         ORDERS PLACED DURING THIS VISIT:  Orders Placed This Encounter   Procedures    CT Angiogram Head w AI Analysis of LVO    CT Angiogram Neck    XR Chest 1 View    MRI Brain Without Contrast    [place] Draw    Comprehensive Metabolic Panel    Protime-INR    aPTT    CBC Auto Differential    Basic Metabolic Panel    CBC (No Diff)    Hemoglobin A1c    Lipid Panel    TSH    NPO Diet NPO Type: Strict NPO    Perform NIH Stroke Scale    Measure Actual Weight    Notify Provider    Notify Provider for SBP Greater Than 140 for Hemorrhagic Stroke Patient    Head of Bed 30 Degrees or Less    Undress and Gown    Vital Signs    Neuro Checks    No Hypotonic Fluids    Nursing Dysphagia Screening (Complete Prior to Giving anything PO)    RN to Place Order SLP Consult (IF swallow screen failed) - Eval & Treat Choosing Reason of RN Dysphagia Screen Failed    Vital Signs    Telemetry - Place Orders & Notify Provider of Results When Patient Experiences Acute Chest Pain, Dysrhythmia or Respiratory Distress    May Be Off Telemetry for Tests    Daily Weights    Vital Signs    Maintain IV Access     Telemetry - Place Orders & Notify Provider of Results When Patient Experiences Acute Chest Pain, Dysrhythmia or Respiratory Distress    Notify physician of changes in level of consciousness, worsening of stroke symptoms, acute headache or severe nausea and vomiting or any of the following vital sign parameters:    Activity As Tolerated    Nursing Dysphagia Screening    RN to Place Order SLP Consult - Eval & Treat Choosing Reason of RN Dysphagia Screen Failed    Nurse to Call MD or Nutrition Services for Diet if Patient Passes Dysphagia Screen    Intake and Output    Neuro Checks    NIHSS Assessment    Place Sequential Compression Device    Maintain Sequential Compression Device    Continuous Pulse Oximetry    Up with assistance    Intake & Output    Oral Care    Order CT Head Without Contrast for Neurological Decline    Provide Stroke Education Material    Tobacco Cessation Education    Code Status and Medical Interventions: CPR (Attempt to Resuscitate); Full    LHA (on-call MD unless specified) Details    Notify Stroke Coordinator    Consult to Case Management     Inpatient Diabetes Educator Consult    Inpatient Neurology Consult Stroke    OT Consult: Eval & Treat    PT Consult: Eval & Treat as tolerated    Oxygen Therapy-    Incentive Spirometry    SLP Consult: Eval & Treat Communication Disorder    POC Glucose Once    POC Glucose Q6H    ECG 12 Lead Stroke Evaluation    ECG 12 Lead Rhythm Change    Adult transthoracic echo complete    Type & Screen    Insert Large-Bore Peripheral IV - RIGHT AC Preferred    Inpatient Admission    Fall Precautions    CBC & Differential    Green Top (Gel)    Lavender Top    Gold Top - SST    Light Blue Top         PROCEDURES  Procedures            PROGRESS, DATA ANALYSIS, CONSULTS, AND MEDICAL DECISION MAKING  All labs have been independently interpreted by me.  All radiology studies have been reviewed by me. All EKG's have been independently viewed and interpreted by  me.  Discussion below represents my analysis of pertinent findings related to patient's condition, differential diagnosis, treatment plan and final disposition.    Differential diagnosis includes but is not limited to stroke, TIA, seizure, migraine headache, intracranial hemorrhage.    Clinical Scores:                       Total (NIH Stroke Scale): 0               ED Course as of 07/15/25 0053   Mon Jul 14, 2025 2027 I discussed the case with MD Sammie with Stroke Neurology at this time regarding the patient.  I discussed work-up, results, concerns.  I discussed the consulting provider's desire for obtaining stat CTA head and neck with subsequent admission to the observation unit for further stroke evaluation.  Notification for TNK or mechanical intervention at this time as the patient has no symptoms currently.   [DC]   2044 Hemoglobin: 15.8 [TR]   2044 EKG PROCEDURE    EKG time: 2027  Rhythm/Rate: Normal sinus, rate 75  P waves and OK: Normal P, normal OK  QRS, axis: Narrow QRS, normal axis  ST and T waves: No acute    Independently Interpreted by me  No prior EKG available for comparison   [TR]   2119 INR(!): 1.11 [TR]   2119 Potassium(!): 3.3 [TR]   2119 PTT: 26.3 [TR]   2119 BP(!): 219/104 [TR]   2119 XR Chest 1 View  My independent interpretation of the imaging study is no dense consolidation [TR]   2317 BP(!): 184/93 [DC]   2329 I discussed the case with CHILANGO Quiñones with [facility] at this time regarding the patient.  I discussed work-up, results, concerns.  I discussed the consulting provider's desire for tele admit to MD Heydi.   [DC]      ED Course User Index  [DC] Tushar Boyd PA  [TR] Jeff Benitez MD       MDM: The patient symptoms at all resolved.  He currently has an NIH of 0.  We will obtain CTA head and neck and discussed with stroke neurology.  I expect he will require admission for further neurologic workup.      COMPLEXITY OF CARE  The patient requires admission.    Please note that  portions of this document were completed with a voice recognition program.    Note Disclaimer: At [facility], we believe that sharing information builds trust and better relationships. You are receiving this note because you recently visited [facility]. It is possible you will see health information before a provider has talked with you about it. This kind of information can be easy to misunderstand. To help you fully understand what it means for your health, we urge you to discuss this note with your provider.         Jeff Benitez MD  07/15/25 0053      Electronically signed by Jeff Benitez MD at 07/15/25 0053       Physician Progress Notes (last 48 hours)  Notes from 07/13/25 1440 through 07/15/25 1440   No notes of this type exist for this encounter.          Consult Notes (last 48 hours)        Ryan Cochran MD at 07/15/25 1305        Consult Orders    1. Inpatient Neurology Consult Stroke [[id number]] ordered by Sussy Avila APRN at 07/14/25 2340                 Neurology Consult Note    Consult Date: 7/15/2025    Referring MD: Ken Soliz MD    Reason for Consult I have been asked to see the patient in neurological consultation to render advice and opinion regarding right-sided weakness    Tushar Herron is a 75 y.o. male past medical history of hypertension, hypercholesterolemia, gout, prostate cancer presents to the ED with chief complaints of right face arm and leg numbness.    Patient states yesterday around 7 PM he was trying to go to bed when all of a sudden he developed right face arm and leg numbness without any weakness which resolved in a couple of hours patient denies any speech difficulties or vision difficulties    Past Medical History:   Diagnosis Date    Elevated cholesterol     Gout     Hypertension     Prostate cancer        Exam  /80 (BP Location: Right arm, Patient Position: Lying)   Pulse 71   Temp 97.7 °F (36.5 °C) (Oral)   Resp 18   Ht  "190.5 cm (75\")   Wt 94.2 kg (207 lb 10.8 oz)   SpO2 99%   BMI 25.96 kg/m²   Gen: NAD, vitals reviewed  MS: oriented x3, recent/remote memory intact, normal attention/concentration, language intact, no neglect.  CN: visual acuity grossly normal, PERRL, EOMI, no facial droop, no dysarthria  Motor: 5/5 throughout upper and lower extremities, normal tone    DATA:    Lab Results   Component Value Date    GLUCOSE 99 07/15/2025    CALCIUM 9.5 07/15/2025     07/15/2025    K 3.6 07/15/2025    CO2 18.3 (L) 07/15/2025     07/15/2025    BUN 8.0 07/15/2025    CREATININE 0.62 (L) 07/15/2025    BCR 12.9 07/15/2025    ANIONGAP 12.7 07/15/2025     Lab Results   Component Value Date    WBC 9.33 07/15/2025    HGB 15.6 07/15/2025    HCT 47.3 07/15/2025    MCV 92.4 07/15/2025     07/15/2025   Vitals  Afebrile  Pulse rate 60-70  Respiratory rate 17-18  Systolic blood pressure 200s to 150    Lab review:   Sodium 137  Creatinine 0.62  Blood glucose 106  Normal LFTs    A1c 5.2  TSH 2.06  LDL 81    WBC 9.33  Hemoglobin 15.6 and platelets 254    Imaging review:     CT head without acute hypodensity or hypodensity    CTA head and neck he has left P2 severe stenosis and intracranial atherosclerotic disease    MRI brain with diffusion restriction along the left thalamus and also left PCA territory        Diagnoses:  Acute ischemic  thalamic and left PCA stroke  Intracranial atherosclerotic disease  Hypertension  Mixed hyperlipidemia  History of prostate cancer  Gout    Pre-stroke MRS: 0  NIHSS: 0    Etiology of the stroke most likely due to intracranial atherosclerotic disease    Plan  Systolic blood pressure goal less than 150  Loaded with aspirin 325 and Plavix 300 mg one-time today  Tomorrow aspirin 81 and Plavix 75 mg daily for at least 3 to 6 months  Plavix response test pending tomorrow morning  Continue Lipitor 80 mg daily    Schedule him to clinic in September 2025.    We will continue to follow.      MDM   " Reviewed: Previous charts, nursing notes and vitals   Reviewed: Previous labs and CT/CTA/MRI scan    Interpretation: Labs and CT/CTA/MRI scan   Total time providing care is : 40 minutes. This excluded time spent performing separately reportable procedures and services  Consults :Neurology/Stroke    Please note that portions of this note were completed with a voice recognition program.     Ryan Cochran MD  Neuro Hospitalist /Vascular Neurology.                 Electronically signed by Ryan Cochran MD at 07/15/25 6078

## 2025-07-16 ENCOUNTER — APPOINTMENT (OUTPATIENT)
Dept: CARDIOLOGY | Facility: HOSPITAL | Age: 75
End: 2025-07-16
Payer: COMMERCIAL

## 2025-07-16 ENCOUNTER — READMISSION MANAGEMENT (OUTPATIENT)
Dept: CALL CENTER | Facility: HOSPITAL | Age: 75
End: 2025-07-16
Payer: COMMERCIAL

## 2025-07-16 VITALS
DIASTOLIC BLOOD PRESSURE: 74 MMHG | HEART RATE: 60 BPM | HEIGHT: 75 IN | TEMPERATURE: 97.5 F | WEIGHT: 209.44 LBS | BODY MASS INDEX: 26.04 KG/M2 | OXYGEN SATURATION: 100 % | RESPIRATION RATE: 18 BRPM | SYSTOLIC BLOOD PRESSURE: 148 MMHG

## 2025-07-16 PROBLEM — I63.532 ACUTE ISCHEMIC LEFT PCA STROKE: Status: ACTIVE | Noted: 2025-07-14

## 2025-07-16 LAB
ANION GAP SERPL CALCULATED.3IONS-SCNC: 11 MMOL/L (ref 5–15)
AORTIC DIMENSIONLESS INDEX: 0.69 (DI)
ASCENDING AORTA: 4 CM
AV MEAN PRESS GRAD SYS DOP V1V2: 5.5 MMHG
AV VMAX SYS DOP: 153.2 CM/SEC
BH CV ECHO MEAS - AO MAX PG: 9.4 MMHG
BH CV ECHO MEAS - AO ROOT DIAM: 4.2 CM
BH CV ECHO MEAS - AO V2 VTI: 32.6 CM
BH CV ECHO MEAS - AVA(I,D): 2.19 CM2
BH CV ECHO MEAS - EDV(CUBED): 148.9 ML
BH CV ECHO MEAS - EDV(MOD-SP2): 141 ML
BH CV ECHO MEAS - EDV(MOD-SP4): 147 ML
BH CV ECHO MEAS - EF(MOD-SP2): 65.2 %
BH CV ECHO MEAS - EF(MOD-SP4): 67.3 %
BH CV ECHO MEAS - ESV(CUBED): 53.2 ML
BH CV ECHO MEAS - ESV(MOD-SP2): 49 ML
BH CV ECHO MEAS - ESV(MOD-SP4): 48 ML
BH CV ECHO MEAS - FS: 29 %
BH CV ECHO MEAS - IVS/LVPW: 1.1 CM
BH CV ECHO MEAS - IVSD: 1.1 CM
BH CV ECHO MEAS - LAT PEAK E' VEL: 8.9 CM/SEC
BH CV ECHO MEAS - LV DIASTOLIC VOL/BSA (35-75): 66 CM2
BH CV ECHO MEAS - LV MASS(C)D: 213.9 GRAMS
BH CV ECHO MEAS - LV MAX PG: 3.8 MMHG
BH CV ECHO MEAS - LV MEAN PG: 2.4 MMHG
BH CV ECHO MEAS - LV SYSTOLIC VOL/BSA (12-30): 21.6 CM2
BH CV ECHO MEAS - LV V1 MAX: 97.7 CM/SEC
BH CV ECHO MEAS - LV V1 VTI: 22.5 CM
BH CV ECHO MEAS - LVIDD: 5.3 CM
BH CV ECHO MEAS - LVIDS: 3.8 CM
BH CV ECHO MEAS - LVOT AREA: 3.2 CM2
BH CV ECHO MEAS - LVOT DIAM: 2.01 CM
BH CV ECHO MEAS - LVPWD: 1 CM
BH CV ECHO MEAS - MED PEAK E' VEL: 7.4 CM/SEC
BH CV ECHO MEAS - MV A DUR: 0.14 SEC
BH CV ECHO MEAS - MV A MAX VEL: 82.7 CM/SEC
BH CV ECHO MEAS - MV DEC SLOPE: 341 CM/SEC2
BH CV ECHO MEAS - MV DEC TIME: 0.23 SEC
BH CV ECHO MEAS - MV E MAX VEL: 79.3 CM/SEC
BH CV ECHO MEAS - MV E/A: 0.96
BH CV ECHO MEAS - MV MAX PG: 3.2 MMHG
BH CV ECHO MEAS - MV MEAN PG: 1.79 MMHG
BH CV ECHO MEAS - MV P1/2T: 77 MSEC
BH CV ECHO MEAS - MV V2 VTI: 26.8 CM
BH CV ECHO MEAS - MVA(P1/2T): 2.9 CM2
BH CV ECHO MEAS - MVA(VTI): 2.7 CM2
BH CV ECHO MEAS - PA ACC TIME: 0.16 SEC
BH CV ECHO MEAS - PA V2 MAX: 84.4 CM/SEC
BH CV ECHO MEAS - RV MAX PG: 1.61 MMHG
BH CV ECHO MEAS - RV V1 MAX: 63.4 CM/SEC
BH CV ECHO MEAS - RV V1 VTI: 13.9 CM
BH CV ECHO MEAS - SV(LVOT): 71.4 ML
BH CV ECHO MEAS - SV(MOD-SP2): 92 ML
BH CV ECHO MEAS - SV(MOD-SP4): 99 ML
BH CV ECHO MEAS - SVI(LVOT): 32.1 ML/M2
BH CV ECHO MEAS - SVI(MOD-SP2): 41.3 ML/M2
BH CV ECHO MEAS - SVI(MOD-SP4): 44.5 ML/M2
BH CV ECHO MEASUREMENTS AVERAGE E/E' RATIO: 9.73
BUN SERPL-MCNC: 5 MG/DL (ref 8–23)
BUN/CREAT SERPL: 10.4 (ref 7–25)
CALCIUM SPEC-SCNC: 8.9 MG/DL (ref 8.6–10.5)
CHLORIDE SERPL-SCNC: 106 MMOL/L (ref 98–107)
CO2 SERPL-SCNC: 20 MMOL/L (ref 22–29)
CREAT SERPL-MCNC: 0.48 MG/DL (ref 0.76–1.27)
DEPRECATED RDW RBC AUTO: 43.5 FL (ref 37–54)
EGFRCR SERPLBLD CKD-EPI 2021: 107.7 ML/MIN/1.73
ERYTHROCYTE [DISTWIDTH] IN BLOOD BY AUTOMATED COUNT: 12.5 % (ref 12.3–15.4)
GLUCOSE SERPL-MCNC: 100 MG/DL (ref 65–99)
HCT VFR BLD AUTO: 47.4 % (ref 37.5–51)
HGB BLD-MCNC: 15.4 G/DL (ref 13–17.7)
LEFT ATRIUM VOLUME INDEX: 20.9 ML/M2
LV EF BIPLANE MOD: 67.9 %
MAGNESIUM SERPL-MCNC: 2 MG/DL (ref 1.6–2.4)
MCH RBC QN AUTO: 30.7 PG (ref 26.6–33)
MCHC RBC AUTO-ENTMCNC: 32.5 G/DL (ref 31.5–35.7)
MCV RBC AUTO: 94.4 FL (ref 79–97)
PA ADP PRP-ACNC: 218 PRU
PHOSPHATE SERPL-MCNC: 3.4 MG/DL (ref 2.5–4.5)
PLATELET # BLD AUTO: 226 10*3/MM3 (ref 140–450)
PMV BLD AUTO: 10.1 FL (ref 6–12)
POTASSIUM SERPL-SCNC: 3.6 MMOL/L (ref 3.5–5.2)
RBC # BLD AUTO: 5.02 10*6/MM3 (ref 4.14–5.8)
SINUS: 3.9 CM
SODIUM SERPL-SCNC: 137 MMOL/L (ref 136–145)
STJ: 3.5 CM
WBC NRBC COR # BLD AUTO: 8.33 10*3/MM3 (ref 3.4–10.8)

## 2025-07-16 PROCEDURE — 99232 SBSQ HOSP IP/OBS MODERATE 35: CPT | Performed by: STUDENT IN AN ORGANIZED HEALTH CARE EDUCATION/TRAINING PROGRAM

## 2025-07-16 PROCEDURE — 83735 ASSAY OF MAGNESIUM: CPT | Performed by: STUDENT IN AN ORGANIZED HEALTH CARE EDUCATION/TRAINING PROGRAM

## 2025-07-16 PROCEDURE — 93246 EXT ECG>7D<15D RECORDING: CPT

## 2025-07-16 PROCEDURE — 85027 COMPLETE CBC AUTOMATED: CPT | Performed by: STUDENT IN AN ORGANIZED HEALTH CARE EDUCATION/TRAINING PROGRAM

## 2025-07-16 PROCEDURE — 84100 ASSAY OF PHOSPHORUS: CPT | Performed by: STUDENT IN AN ORGANIZED HEALTH CARE EDUCATION/TRAINING PROGRAM

## 2025-07-16 PROCEDURE — 80048 BASIC METABOLIC PNL TOTAL CA: CPT | Performed by: STUDENT IN AN ORGANIZED HEALTH CARE EDUCATION/TRAINING PROGRAM

## 2025-07-16 PROCEDURE — 85576 BLOOD PLATELET AGGREGATION: CPT | Performed by: STUDENT IN AN ORGANIZED HEALTH CARE EDUCATION/TRAINING PROGRAM

## 2025-07-16 RX ORDER — PANTOPRAZOLE SODIUM 40 MG/1
40 TABLET, DELAYED RELEASE ORAL
Status: DISCONTINUED | OUTPATIENT
Start: 2025-07-16 | End: 2025-07-16 | Stop reason: HOSPADM

## 2025-07-16 RX ORDER — AMLODIPINE BESYLATE 5 MG/1
5 TABLET ORAL DAILY
Status: DISCONTINUED | OUTPATIENT
Start: 2025-07-16 | End: 2025-07-16 | Stop reason: HOSPADM

## 2025-07-16 RX ORDER — AMLODIPINE BESYLATE 5 MG/1
5 TABLET ORAL DAILY
Qty: 30 TABLET | Refills: 0 | Status: SHIPPED | OUTPATIENT
Start: 2025-07-17

## 2025-07-16 RX ORDER — TICAGRELOR 60 MG/1
60 TABLET, FILM COATED ORAL 2 TIMES DAILY
Qty: 60 TABLET | Refills: 0 | Status: SHIPPED | OUTPATIENT
Start: 2025-07-16

## 2025-07-16 RX ORDER — TICAGRELOR 60 MG/1
60 TABLET, FILM COATED ORAL 2 TIMES DAILY
Status: DISCONTINUED | OUTPATIENT
Start: 2025-07-16 | End: 2025-07-16 | Stop reason: HOSPADM

## 2025-07-16 RX ORDER — PANTOPRAZOLE SODIUM 40 MG/1
40 TABLET, DELAYED RELEASE ORAL
Qty: 30 TABLET | Refills: 0 | Status: SHIPPED | OUTPATIENT
Start: 2025-07-17

## 2025-07-16 RX ORDER — ASPIRIN 81 MG/1
81 TABLET, CHEWABLE ORAL DAILY
Qty: 30 TABLET | Refills: 0 | Status: SHIPPED | OUTPATIENT
Start: 2025-07-17

## 2025-07-16 RX ORDER — ATORVASTATIN CALCIUM 80 MG/1
80 TABLET, FILM COATED ORAL NIGHTLY
Qty: 30 TABLET | Refills: 0 | Status: SHIPPED | OUTPATIENT
Start: 2025-07-16

## 2025-07-16 RX ORDER — POTASSIUM CHLORIDE 1500 MG/1
40 TABLET, EXTENDED RELEASE ORAL ONCE
Status: COMPLETED | OUTPATIENT
Start: 2025-07-16 | End: 2025-07-16

## 2025-07-16 RX ADMIN — CLOPIDOGREL BISULFATE 75 MG: 75 TABLET ORAL at 08:43

## 2025-07-16 RX ADMIN — AMLODIPINE BESYLATE 5 MG: 5 TABLET ORAL at 08:43

## 2025-07-16 RX ADMIN — ALLOPURINOL 100 MG: 100 TABLET ORAL at 08:43

## 2025-07-16 RX ADMIN — LISINOPRIL 20 MG: 20 TABLET ORAL at 08:43

## 2025-07-16 RX ADMIN — ASPIRIN 81 MG CHEWABLE TABLET 81 MG: 81 TABLET CHEWABLE at 08:43

## 2025-07-16 RX ADMIN — POTASSIUM CHLORIDE 40 MEQ: 1500 TABLET, EXTENDED RELEASE ORAL at 08:44

## 2025-07-16 RX ADMIN — PANTOPRAZOLE SODIUM 40 MG: 40 TABLET, DELAYED RELEASE ORAL at 13:41

## 2025-07-16 NOTE — PLAN OF CARE
Problem: Adult Inpatient Plan of Care  Goal: Absence of Hospital-Acquired Illness or Injury  Intervention: Identify and Manage Fall Risk  Recent Flowsheet Documentation  Taken 7/16/2025 0600 by Colette Roger RN  Safety Promotion/Fall Prevention: safety round/check completed  Taken 7/16/2025 0400 by Colette Roger RN  Safety Promotion/Fall Prevention: safety round/check completed  Taken 7/16/2025 0200 by Colette Roger RN  Safety Promotion/Fall Prevention: safety round/check completed  Taken 7/16/2025 0000 by Colette Roger RN  Safety Promotion/Fall Prevention: safety round/check completed  Taken 7/15/2025 2200 by Colette Roger RN  Safety Promotion/Fall Prevention: safety round/check completed  Taken 7/15/2025 2050 by Colette Roger RN  Safety Promotion/Fall Prevention:   activity supervised   assistive device/personal items within reach   lighting adjusted   muscle strengthening facilitated   room organization consistent   safety round/check completed  Intervention: Prevent Skin Injury  Recent Flowsheet Documentation  Taken 7/16/2025 0600 by Colette Roger RN  Body Position: position changed independently  Taken 7/16/2025 0400 by Colette Roger RN  Body Position: position changed independently  Taken 7/16/2025 0200 by Colette Roger RN  Body Position: position changed independently  Taken 7/16/2025 0000 by Coltete Roger RN  Body Position: position changed independently  Taken 7/15/2025 2200 by Colette Roger RN  Body Position: position changed independently  Taken 7/15/2025 2050 by Colette Roger RN  Body Position: position changed independently   Goal Outcome Evaluation:

## 2025-07-16 NOTE — DISCHARGE SUMMARY
Patient Name: Tushar Herron  : 1950  MRN: [id number]    Date of Admission: 2025  Date of Discharge:  2025  Primary Care Physician: Daniel Billy MD      Chief Complaint:   Extremity Weakness      Discharge Diagnoses     Active Hospital Problems    Diagnosis  POA    **Acute ischemic left PCA stroke [I63.532]  Yes    Hypokalemia [E87.6]  Yes    Hypertension [I10]  Yes    Hypercholesterolemia [E78.00]  Yes      Resolved Hospital Problems   No resolved problems to display.        Hospital Course     Mr. Herron is a 75 y.o. male with a history of hypertension, hypercholesteremia, juan prostate cancer, gout st who presented to [facility] initially complaining of extremity weakness.  Please see the admitting history and physical for further details.  He was found to have acute ischemic thalamic and left PCA stroke and was admitted to the hospital for further evaluation and treatment.      Patient follow-up acute right-sided numbness with associated dizziness on 2025 and notified EMS for ER evaluation.  Symptoms resolved in route.  BP elevated on arrival.      Patient received Plavix load and aspirin in ER.    MRI confirmed acute ischemic thalamic and left PCA stroke.    Patient is not a Plavix responder given P2Y12 218; therefore, Plavix switched to Brilinta at discharge.    Neurology evaluated patient and recommend continue high-dose statin therapy, Brilinta for 3 months, Zio patch at discharge for LA enlargement, and follow-up with neurology 2025.    Patient tolerating intake & physical activity with no evidence of focal deficit--appears medically stable for discharge home on 2025 and agrees plan for follow-up as previously discussed to include primary care provider for blood pressure monitoring.    Day of Discharge       Physical Exam:  Temp:  [97.5 °F (36.4 °C)-98.4 °F (36.9 °C)] 97.5 °F (36.4 °C)  Heart Rate:  [60-75] 60  Resp:  [18] 18  BP: (138-178)/(74-90)  148/74  Body mass index is 26.18 kg/m².    Physical Exam  Constitutional:       General: He is not in acute distress.     Appearance: He is not toxic-appearing.   HENT:      Head: Normocephalic and atraumatic.   Eyes:      Extraocular Movements: Extraocular movements intact.      Conjunctiva/sclera: Conjunctivae normal.   Cardiovascular:      Rate and Rhythm: Normal rate.      Heart sounds: Normal heart sounds.   Pulmonary:      Effort: Pulmonary effort is normal.      Breath sounds: Normal breath sounds.   Abdominal:      General: Bowel sounds are normal.      Palpations: Abdomen is soft.   Musculoskeletal:      Cervical back: Normal range of motion and neck supple.      Right lower leg: No edema.      Left lower leg: No edema.   Skin:     General: Skin is warm and dry.   Neurological:      General: No focal deficit present.      Mental Status: He is alert and oriented to person, place, and time. Mental status is at baseline.   Psychiatric:         Behavior: Behavior normal.         Thought Content: Thought content normal.         Consultants     Consult Orders (all) (From admission, onward)       Start     Ordered    07/14/25 2338  Notify Stroke Coordinator  Once        Provider:  (Not yet assigned)    07/14/25 2340 07/14/25 2338  Consult to Case Management   Once        Provider:  (Not yet assigned)    07/14/25 2340 07/14/25 2338  Inpatient Diabetes Educator Consult  Once,   Status:  Canceled        Provider:  (Not yet assigned)    07/14/25 2340 07/14/25 2338  Inpatient Neurology Consult Stroke  Once        Specialty:  Neurology  Provider:  Bijan Francis MD    07/14/25 2340 07/14/25 2316  [facility] (on-call MD unless specified) Details  Once        Specialty:  Hospitalist  Provider:  (Not yet assigned)    07/14/25 2315 07/14/25 2025  Inpatient Neurology Consult Stroke  Once,   Status:  Canceled        Specialty:  Neurology  Provider:  (Not yet assigned)    07/14/25 2026 07/14/25  2025  Inpatient Neurology Consult Stroke  Once,   Status:  Canceled        Specialty:  Neurology  Provider:  (Not yet assigned)    07/14/25 2026                  Procedures     * Surgery not found *    Imaging Results (All)       Procedure Component Value Units Date/Time    MRI Brain Without Contrast [[id number]] Collected: 07/15/25 0906     Updated: 07/15/25 0911    Narrative:      MRI OF THE BRAIN WITHOUT CONTRAST ON 07/15/2025     CLINICAL HISTORY: This is a 75-year-old male patient with right-sided  weakness and numbness that has resolved. The patient has a history of  prostate cancer.     TECHNIQUE: Axial T1, FLAIR, fat-suppressed T2, axial diffusion and  gradient echo T2 and sagittal T1-weighted images were obtained of the  entire head.     This is correlated to a contrast-enhanced CT angiogram of the head and  neck yesterday evening on 07/14/2025 at 8:47 p.m.     FINDINGS: On the axial FLAIR and T2-weighted images, there are multiple  tiny patchy nodular foci of T2 high signal extending from the  periventricular into the subcortical white matter consistent with  mild-to-moderate small vessel disease. On the diffusion-weighted image  45 sequence 5, there is an ovoid 6 x 4 mm diffusion hyperintense focus  projecting over the inferior lateral left thalamus that extends into the  medial fibers of the posterior limb of the left internal capsule that is  low signal on the ADC maps and is compatible with a tiny focus of  cytotoxic edema from a tiny acute lacunar-type infarct. Furthermore, on  the axial diffusion-weighted images, there is a tiny 3 mm diffusion  hyperintense focus in the lateral left occipital juxtacortical white  matter and a very tiny 2-3 mm focus in the posterior left occipital  white matter. It is suspicious for very subtle tiny acute left occipital  infarcts. The lateral left occipital white matter lesion may be in the  left MCA territory although the posterior left occipital lobe tiny  acute  infarct may be in the left PCA territory. The remainder of the brain  parenchyma is normal in signal intensity. There is perhaps mild cerebral  atrophy. The ventricles are upper limits of normal in size. I see no  mass effect, no midline shift and no extra-axial fluid collections are  identified. On the gradient echo T2-weighted sequence, no acute or old  blood breakdown products are seen intracranially. Specifically, I see no  evidence of hemosiderin deposition in the brain or the extra-axial  spaces overlying the brain. The calvarium and skull base demonstrate  normal marrow signal intensity. There is mild inferior left maxillary  sinus mucosal thickening. Otherwise, the paranasal sinuses and the  mastoid air cells and the middle ear cavities are clear. The orbits are  normal in appearance. Good flow voids are demonstrated within the  cerebral vessels and in the dural venous sinuses.       Impression:      1. There is mild-to-moderate small vessel disease in the cerebral white  matter.     2. There is a 6 x 4 mm tiny acute infarct extending from the lateral  left thalamus into the medial fibers of the posterior limb of the left  internal capsule that may account for the patient's right-sided  weakness. Furthermore, there are 2 separate tiny 2-3 mm acute infarcts  in the left occipital lobe, one in the lateral left occipital  juxtacortical white matter in the left MCA territory and one in the  posterior left occipital juxtacortical white matter that may be in the  left PCA territory. The remainder of the MRI of the brain is normal.      The results of this study were communicated to Dr. Cochran from Stroke  Neurology by telephone on 07/15/2025 at 8:15 AM.     This report was finalized on 7/15/2025 9:08 AM by Dr. Kilo Haines M.D  on Workstation: [id number]       CT Angiogram Head w AI Analysis of LVO [[id number]] Collected: 07/14/25 2119     Updated: 07/14/25 2133    Narrative:      HEAD CT WITHOUT  CONTRAST, HEAD & NECK CTA WITH CONTRAST     INDICATION:  Right-sided weakness.     TECHNIQUE:  Axial images were acquired from the skull base to vertex without  contrast, including multiplanar reformats, per standard departmental  protocol , followed by CT angiogram of the head and neck with IV  contrast. 3-D postprocessing was performed and reviewed.  Evaluation for  a significant carotid arterial stenosis is based on the NASCET criteria.   Radiation dose reduction techniques were utilized, including automated  exposure control, and exposure modulation based on body size.     COMPARISON:  None available.     FINDINGS:     Head CT: There is no CT evidence of acute intracranial hemorrhage, mass,  or infarct. There is volume loss, but there is no evidence of  hydrocephalus or extra-axial fluid collection.  There are nonspecific  white matter changes, but there is no acute intracranial abnormality.     CTA neck: There is a normal aortic arch branching pattern without  proximal great vessel stenosis. Both vertebral arteries are patent  throughout the neck, and supply the basilar artery.  Both common carotid  arteries are normally patent. There is plaque at both cervical carotid  bifurcations, with 0% stenosis in both internal carotid arteries.     CTA head:  There is symmetric distal intracranial runoff in the  anterior, middle, and posterior cerebral artery territories.  There is  no evidence of intracranial aneurysm, or of high-grade intracranial  flow-limiting stenosis.  There is no evidence of branch vessel  occlusion, or region or zone of hypoperfusion.  The dural venous sinuses  appear normal.     Extravascular structures: There is no intracranial mass or abnormal  enhancement.   The extracranial and cervical soft tissue structures show  no acute abnormality.  The visualized lung apices show no acute  abnormality.  There is no acute bony abnormality.       Impression:         Negative unenhanced head CT, no  evidence of acute intracranial  abnormality. Mild senescent change.     There is plaque at both cervical carotid bifurcations, but 0% stenosis  in both internal carotid arteries. Both vertebral arteries are patent  throughout the neck.     Normal head CT angiogram, no acute intracranial vascular abnormality.     This report was finalized on 7/14/2025 9:30 PM by Dr. Antonio Fuentes M.D on Workstation: [id number]       CT Angiogram Neck [[id number]] Collected: 07/14/25 2119     Updated: 07/14/25 2133    Narrative:      HEAD CT WITHOUT CONTRAST, HEAD & NECK CTA WITH CONTRAST     INDICATION:  Right-sided weakness.     TECHNIQUE:  Axial images were acquired from the skull base to vertex without  contrast, including multiplanar reformats, per standard departmental  protocol , followed by CT angiogram of the head and neck with IV  contrast. 3-D postprocessing was performed and reviewed.  Evaluation for  a significant carotid arterial stenosis is based on the NASCET criteria.   Radiation dose reduction techniques were utilized, including automated  exposure control, and exposure modulation based on body size.     COMPARISON:  None available.     FINDINGS:     Head CT: There is no CT evidence of acute intracranial hemorrhage, mass,  or infarct. There is volume loss, but there is no evidence of  hydrocephalus or extra-axial fluid collection.  There are nonspecific  white matter changes, but there is no acute intracranial abnormality.     CTA neck: There is a normal aortic arch branching pattern without  proximal great vessel stenosis. Both vertebral arteries are patent  throughout the neck, and supply the basilar artery.  Both common carotid  arteries are normally patent. There is plaque at both cervical carotid  bifurcations, with 0% stenosis in both internal carotid arteries.     CTA head:  There is symmetric distal intracranial runoff in the  anterior, middle, and posterior cerebral artery territories.  There is  no  evidence of intracranial aneurysm, or of high-grade intracranial  flow-limiting stenosis.  There is no evidence of branch vessel  occlusion, or region or zone of hypoperfusion.  The dural venous sinuses  appear normal.     Extravascular structures: There is no intracranial mass or abnormal  enhancement.   The extracranial and cervical soft tissue structures show  no acute abnormality.  The visualized lung apices show no acute  abnormality.  There is no acute bony abnormality.       Impression:         Negative unenhanced head CT, no evidence of acute intracranial  abnormality. Mild senescent change.     There is plaque at both cervical carotid bifurcations, but 0% stenosis  in both internal carotid arteries. Both vertebral arteries are patent  throughout the neck.     Normal head CT angiogram, no acute intracranial vascular abnormality.     This report was finalized on 7/14/2025 9:30 PM by Dr. Antonio Fuentes M.D on Workstation: [id number]       XR Chest 1 View [[id number]] Collected: 07/14/25 2111     Updated: 07/14/25 2114    Narrative:      AP CHEST     HISTORY: Acute stroke protocol     COMPARISON: None available     FINDINGS: Lung fields are clear. Heart size within normal limits. No  evidence of pneumothorax. No acute bony abnormality       Impression:      No active disease           This report was finalized on 7/14/2025 9:11 PM by Dr. Adrian Hinojosa M.D on Workstation: [id number]               Results for orders placed during the hospital encounter of 07/14/25    Adult transthoracic echo complete 07/16/2025 12:15 PM    Interpretation Summary    Left ventricular systolic function is normal. Left ventricular ejection fraction appears to be 61 - 65%.    Left ventricular diastolic function is consistent with (grade I) impaired relaxation.    Normal right ventricular cavity size and systolic function noted.    The left atrial cavity is mildly dilated.    Saline test results are negative.    The aortic  valve leaflets are mildly to moderately calcified    There is moderate mitral annular calcification.    Insufficient TR velocity profile to estimate the right ventricular systolic pressure.    Mild dilation of the aortic root is present. Aortic root = 4.2 cm The ascending aorta is mildly dilated at 4.0 cm    There is no evidence of pericardial effusion.    Pertinent Labs     Results from last 7 days   Lab Units 07/16/25  0528 07/15/25  0449 07/14/25  2033   WBC 10*3/mm3 8.33 9.33 7.73   HEMOGLOBIN g/dL 15.4 15.6 15.8   PLATELETS 10*3/mm3 226 254 233     Results from last 7 days   Lab Units 07/16/25  0528 07/15/25  0449 07/14/25  2033   SODIUM mmol/L 137 137 133*   POTASSIUM mmol/L 3.6 3.6 3.3*   CHLORIDE mmol/L 106 106 101   CO2 mmol/L 20.0* 18.3* 20.2*   BUN mg/dL 5.0* 8.0 10.0   CREATININE mg/dL 0.48* 0.62* 0.80   GLUCOSE mg/dL 100* 99 106*   EGFR mL/min/1.73 107.7 99.7 92.3     Results from last 7 days   Lab Units 07/14/25 2033   ALBUMIN g/dL 4.2   BILIRUBIN mg/dL 0.3   ALK PHOS U/L 100   AST (SGOT) U/L 21   ALT (SGPT) U/L 23     Results from last 7 days   Lab Units 07/16/25  0528 07/15/25  0449 07/14/25  2033   CALCIUM mg/dL 8.9 9.5 9.5   ALBUMIN g/dL  --   --  4.2   MAGNESIUM mg/dL 2.0  --   --    PHOSPHORUS mg/dL 3.4  --   --            Results from last 7 days   Lab Units 07/15/25  0449   CHOLESTEROL mg/dL 154   TRIGLYCERIDES mg/dL 130   HDL CHOL mg/dL 50   LDL CHOL mg/dL 81             Test Results Pending at Discharge     Pending Results       Procedure [Order ID] Specimen - Date/Time    Holter Monitor - 72 Hour Up To 15 Days - In process [[id number]] Resulted: 07/16/25 1342     Updated: 07/16/25 1342    This result has not been signed. Information might be incomplete.                Discharge Details        Discharge Medications        New Medications        Instructions Start Date   amLODIPine 5 MG tablet  Commonly known as: NORVASC   5 mg, Oral, Daily   Start Date: July 17, 2025     aspirin 81 MG  chewable tablet   81 mg, Oral, Daily   Start Date: July 17, 2025     pantoprazole 40 MG EC tablet  Commonly known as: PROTONIX   40 mg, Oral, Every Early Morning   Start Date: July 17, 2025     ticagrelor 60 MG tablet tablet  Commonly known as: BRILINTA   60 mg, Oral, 2 Times Daily             Changes to Medications        Instructions Start Date   atorvastatin 80 MG tablet  Commonly known as: LIPITOR  What changed:   medication strength  how much to take   80 mg, Oral, Nightly             Continue These Medications        Instructions Start Date   allopurinol 100 MG tablet  Commonly known as: ZYLOPRIM   100 mg, Daily      lisinopril 20 MG tablet  Commonly known as: PRINIVIL,ZESTRIL   20 mg, Daily               Allergies   Allergen Reactions    Penicillins Unknown - High Severity       Discharge Disposition:  Home or Self Care      Discharge Diet:  Diet Order   Procedures    Diet: Cardiac; Healthy Heart (2-3 Na+); Fluid Consistency: Thin (IDDSI 0)       Discharge Activity:   Activity Instructions       Activity as Tolerated              CODE STATUS:    Code Status and Medical Interventions: CPR (Attempt to Resuscitate); Full   Ordered at: 07/14/25 3060     Code Status (Patient has no pulse and is not breathing):    CPR (Attempt to Resuscitate)     Medical Interventions (Patient has pulse or is breathing):    Full       No future appointments.  Additional Instructions for the Follow-ups that You Need to Schedule       Discharge Follow-up with PCP   As directed       Currently Documented PCP:    Daniel Billy MD    PCP Phone Number:    [phone]     Follow Up Details: Please call to schedule a one week or earliest available follow-up with PCP; BP               Follow-up Information       Daniel Billy MD. Schedule an appointment as soon as possible for a visit in 1 week(s).    Specialty: Family Medicine  Contact information:  [address] [place] [address]  [phone]               [facility]  NEUROLOGY [facility]. Schedule an appointment as soon as possible for a visit in 1 month(s).    Contact information:  [facility] [facility] [address]  [facility] [address] [phone]             Daniel Billy MD .    Specialty: Family Medicine  Why: Please call to schedule a one week or earliest available follow-up with PCP; BP  Contact information:  [address] [facility] [address]  [facility] [address]  [phone]               Ryan Cochran MD Follow up.    Specialties: Neurology, Hospitalist, Vascular Neurology  Why: Please schedule follow-up appointment September 2025 was previously discussed  Contact information:  [address] [facility] [address]  [place] [id number] [phone]                             Additional Instructions for the Follow-ups that You Need to Schedule       Discharge Follow-up with PCP   As directed       Currently Documented PCP:    Daniel Billy MD    PCP Phone Number:    [phone]     Follow Up Details: Please call to schedule a one week or earliest available follow-up with PCP; BP            Time Spent on Discharge:  Greater than 30 minutes      CLARK Peters  [facility]  07/16/25  14:26 EDT

## 2025-07-16 NOTE — PROGRESS NOTES
"DOS: 2025  NAME: Tushar Herron   : 1950  PCP: Daniel Billy MD  Chief Complaint   Patient presents with    Extremity Weakness       Chief complaint: Episode of right-sided numbness    Subjective: Patient has been seen and evaluated, no acute events overnight    Objective:  Vital signs: /74 (BP Location: Left arm, Patient Position: Sitting)   Pulse 60   Temp 97.5 °F (36.4 °C) (Oral)   Resp 18   Ht 190.5 cm (75\")   Wt 95 kg (209 lb 7 oz)   SpO2 100%   BMI 26.18 kg/m²    Gen: NAD, vitals reviewed  MS: oriented x3, recent/remote memory intact, normal attention/concentration, language intact, no neglect.  CN: visual acuity grossly normal, PERRL, EOMI, no facial droop, no dysarthria  Motor: 5/5 throughout upper and lower extremities, normal tone  Sensory: intact to light touch all 4 ext.    Laboratory results:  Lab Results   Component Value Date    GLUCOSE 100 (H) 2025    CALCIUM 8.9 2025     2025    K 3.6 2025    CO2 20.0 (L) 2025     2025    BUN 5.0 (L) 2025    CREATININE 0.48 (L) 2025    BCR 10.4 2025    ANIONGAP 11.0 2025     Lab Results   Component Value Date    WBC 8.33 2025    HGB 15.4 2025    HCT 47.4 2025    MCV 94.4 2025     2025     Lab Results   Component Value Date    LDL 81 07/15/2025         Lab 07/15/25  0449   HEMOGLOBIN A1C 5.20      Vitals  Afebrile  Pulse rate 60s  Respiratory rate 18  Start blood pressure 140s to 160    Review of labs:   Sodium 137  Creatinine 0.48  Normal LFTs      A1c 5.2  TSH 2.06  LDL 81     P2 Y response is 218    WBC 8.33  Globin 15.4 and platelets 226     Imaging review:      CT head without acute hypodensity or hypodensity     CTA head and neck he has left P2 severe stenosis and intracranial atherosclerotic disease     MRI brain with diffusion restriction along the left thalamus and also left PCA territory     TTE showed 65% EF mild LAE no " clot no thrombus no PFO     Diagnoses:  Acute ischemic  thalamic and left PCA stroke  Intracranial atherosclerotic disease  Hypertension  Mixed hyperlipidemia  History of prostate cancer  Gout     Pre-stroke MRS: 0  NIHSS: 0     Etiology of the stroke most likely due to intracranial atherosclerotic disease     Plan  Systolic blood pressure goal less than 150  Loaded with aspirin 325 and Plavix 300 mg one-time today  Patient is not a Plavix responder his P2 Y was 218  Changing Plavix to Brilinta  Continue aspirin 81 and Brilinta 60 mg twice daily daily for 3 months  Continue Lipitor 80 mg daily  Given LA enlargement will also send the patient home on Zio patch    Schedule him to clinic in September 2025.     No further workup anticipated from neurology will sign off patient can be discharged home       MDM   Reviewed: Previous charts, nursing notes and vitals   Reviewed: Previous labs and CT scan    Interpretation: Labs and CT scan   Total time providing care is :40  minutes. This excluded time spent performing separately reportable procedures and services  Consults :Neurology/Stroke    Please note that portions of this note were completed with a voice recognition program.     Ryan Cochran MD  Neuro Hospitalist /Vascular Neurology.

## 2025-07-16 NOTE — PLAN OF CARE
Problem: Adult Inpatient Plan of Care  Goal: Plan of Care Review  Outcome: Met  Flowsheets (Taken 7/16/2025 1332)  Progress: improving  Plan of Care Reviewed With: patient  Goal: Patient-Specific Goal (Individualized)  Outcome: Met  Goal: Absence of Hospital-Acquired Illness or Injury  Outcome: Met  Intervention: Identify and Manage Fall Risk  Recent Flowsheet Documentation  Taken 7/16/2025 1200 by Jose Alejandro Rocha RN  Safety Promotion/Fall Prevention:   safety round/check completed   assistive device/personal items within reach  Taken 7/16/2025 1002 by Jose Alejnadro Rocha RN  Safety Promotion/Fall Prevention:   safety round/check completed   assistive device/personal items within reach  Taken 7/16/2025 0800 by Jose Alejandro Rocha RN  Safety Promotion/Fall Prevention:   safety round/check completed   assistive device/personal items within reach  Intervention: Prevent Infection  Recent Flowsheet Documentation  Taken 7/16/2025 1200 by Jose Alejandro Rocha RN  Infection Prevention:   personal protective equipment utilized   environmental surveillance performed  Taken 7/16/2025 1002 by Jose Alejandro Rocha RN  Infection Prevention:   personal protective equipment utilized   environmental surveillance performed  Taken 7/16/2025 0800 by Jose Alejandro Rocha RN  Infection Prevention:   personal protective equipment utilized   environmental surveillance performed  Goal: Optimal Comfort and Wellbeing  Outcome: Met  Goal: Readiness for Transition of Care  Outcome: Met   Goal Outcome Evaluation:  Plan of Care Reviewed With: patient        Progress: improving        Orders for d/c placed. Waiting for TTE results and blood test pending.   Order for Holter monitor placed. EKG contacted. Pending monitor placement. Education completed.

## 2025-07-16 NOTE — DISCHARGE INSTRUCTIONS
Notify your primary care provider if you experience chest pain, difficulty breathing, fevers/chills, nausea or vomiting, bleeding in stool, excessive diarrhea, numbness or weakness or tingling in any part of your body.    Will need to continue to take aspirin and Brilinta for 3 months until October 15th, 2025.  I prescribed 30-day supply of medications, follow-up with primary care provider for further refills.  Do not take medication such as ibuprofen, Aleve, naproxen, Advil due to increased risk of bleeding when taken together with Brilinta and aspirin.  Take pantoprazole for stomach protection while on Brilinta and aspirin, once Brilinta discontinued can stop taking pantoprazole.        Echocardiogram showed dilation of the aortic root.  Will need follow-up with cardiology for monitoring.  Referral placed.

## 2025-07-16 NOTE — CASE MANAGEMENT/SOCIAL WORK
Case Management Discharge Note      Final Note: Home--no needs per private auto         Selected Continued Care - Admitted Since 7/14/2025       Destination    No services have been selected for the patient.                Durable Medical Equipment    No services have been selected for the patient.                Dialysis/Infusion    No services have been selected for the patient.                Home Medical Care    No services have been selected for the patient.                Therapy    No services have been selected for the patient.                Community Resources    No services have been selected for the patient.                Community & DME    No services have been selected for the patient.                    Transportation Services  Transportation: Private Transportation  Private: Car    Final Discharge Disposition Code: 01 - home or self-care

## 2025-07-16 NOTE — OUTREACH NOTE
Prep Survey      Flowsheet Row Responses   [facility] facility patient discharged from? [place]   Is LACE score < 7 ? Yes   Eligibility Readm Mgmt   Discharge diagnosis *Stroke-like symptoms   Does the patient have one of the following disease processes/diagnoses(primary or secondary)? Stroke   Does the patient have Home health ordered? No   Is there a DME ordered? No   Prep survey completed? Yes            ARASH VENTURA - Registered Nurse

## 2025-07-17 NOTE — PAYOR COMM NOTE
"Yanira Herron (75 y.o. Male)          DC SUMMARY FOR [id number]     UR F# [phone]         Date of Birth   1950    Social Security Number       [address] [place] [address]    Home Phone   [phone]    MRN   [id number]       Judaism   Confucianist    Marital Status   Single                            Admission Date   7/14/2025    Admission Type   Emergency    Admitting Provider   Sharla Gonzalez MD    Attending Provider       Department, Room/Bed   [address], P578/1       Discharge Date   7/16/2025    Discharge Disposition   Home or Self Care    Discharge Destination                                 Attending Provider: (none)   Allergies: Penicillins    Isolation: None   Infection: None   Code Status: Prior    Ht: 190.5 cm (75\")   Wt: 95 kg (209 lb 7 oz)    Admission Cmt: None   Principal Problem: Acute ischemic left PCA stroke [I63.532]                   Active Insurance as of 7/14/2025       Primary Coverage       Payor Plan Insurance Group Employer/Plan Group    ANTH BLUE CROSS ANTH BLUE CROSS BLUE SHIELD PPO [id number]       Payor Plan Address Payor Plan Phone Number Payor Plan Fax Number Effective Dates    [address] [phone]  1/1/2022 - None Entered    [facility] [id number]         Subscriber Name Subscriber Birth Date Member ID       YANIRA HERRON 1950 [id number]               Secondary Coverage       Payor Plan Insurance Group Employer/Plan Group    MEDICARE MEDICARE A ONLY        Payor Plan Address Payor Plan Phone Number Payor Plan Fax Number Effective Dates    [address] [phone]  2/1/2015 - None Entered    [facility] [id number]         Subscriber Name Subscriber Birth Date Member ID       YANIRA HERRON 1950 [id number]                     Emergency Contacts        (Rel.) Home Phone Work Phone Mobile Phone    CHLOE PEGUERO (Sister) [phone] -- [phone]                 Discharge Summary    "     Yung Huston APRN at 07/16/25 1426       Attestation signed by Sharla Gonzalez MD at 07/16/25 1523      Addendum:   Sharla PRESSLEY MD, personally performed the services described in this documentation as scribed by the Yung Mccarthy APRN  , and it is both accurate and complete.     I provided a substantive portion of the care of the patient, >50%.  I personally performed the     physical exam in its entirety, and below are my findings  General: Alert and oriented x3, no acute distress.  HEENT: Normocephalic, atraumatic  Eyes: PERRL, EOMI, anicteric sclerae  Lungs: Clear to auscultation bilaterally, no crackles or wheezes  CV: Regular rate and rhythm, no murmurs rubs or gallops  Abdomen: Soft, nontender, nondistended.  Normoactive bowel sounds  Extremities: No significant peripheral edema  Skin: Clean/dry/intact, no rashes  Neuro: Cranial nerves II through XII intact, no gross focal neurological deficits appreciated  Psych: Appropriate mood and affect      I agree with assessment and plan as per above with the addition of   Acute ischemic  thalamic and left PCA stroke   MRI  brain showed -There is a 6 x 4 mm tiny acute infarct extending from the lateral  left thalamus into the medial fibers of the posterior limb of the left  internal capsule  Furthermore, there are 2 separate tiny 2-3 mm acute infarcts  in the left occipital lobe, one in the lateral left occipital juxtacortical white matter in the left MCA territory and one in the posterior left occipital juxtacortical white matter that may be in the  left PCA territory  -Initially was initiated on aspirin, Plavix, however was nonresponder to Plavix and transition to Brilinta.  Discharged on Brilinta and aspirin will need to continue for 3 months.  Initiated on pantoprazole while on dual antiplatelets  - Increase atorvastatin to 80 mg daily.  - Holter monitor placed prior to discharge.    Hypertension  - Blood pressure was not  well-controlled on home lisinopril alone.  Added amlodipine 5 mg daily, outpatient lisinopril 10 mg daily was continued.  SBP goal 150 and below per neurology recommendations.  Will need to follow-up with PCP for further evaluation management.    Aortic root dilation  - Seen on echo.  Will need surveillance imaging.  Placed referral to cardiology.           Sharla Gonzalez MD   2025  15:19 EDT                            Patient Name: Tushar Herron  : 1950  MRN: [id number]    Date of Admission: 2025  Date of Discharge:  2025  Primary Care Physician: Daniel Billy MD      Chief Complaint:   Extremity Weakness      Discharge Diagnoses     Active Hospital Problems    Diagnosis  POA    **Acute ischemic left PCA stroke [I63.532]  Yes    Hypokalemia [E87.6]  Yes    Hypertension [I10]  Yes    Hypercholesterolemia [E78.00]  Yes      Resolved Hospital Problems   No resolved problems to display.        Hospital Course     Mr. Herron is a 75 y.o. male with a history of hypertension, hypercholesteremia, juan prostate cancer, gout st who presented to [facility] initially complaining of extremity weakness.  Please see the admitting history and physical for further details.  He was found to have acute ischemic thalamic and left PCA stroke and was admitted to the hospital for further evaluation and treatment.      Patient follow-up acute right-sided numbness with associated dizziness on 2025 and notified EMS for ER evaluation.  Symptoms resolved in route.  BP elevated on arrival.      Patient received Plavix load and aspirin in ER.    MRI confirmed acute ischemic thalamic and left PCA stroke.    Patient is not a Plavix responder given P2Y12 218; therefore, Plavix switched to Brilinta at discharge.    Neurology evaluated patient and recommend continue high-dose statin therapy, Brilinta for 3 months, Zio patch at discharge for LA enlargement, and follow-up with neurology September  2025.    Patient tolerating intake & physical activity with no evidence of focal deficit--appears medically stable for discharge home on 7/16/2025 and agrees plan for follow-up as previously discussed to include primary care provider for blood pressure monitoring.    Day of Discharge       Physical Exam:  Temp:  [97.5 °F (36.4 °C)-98.4 °F (36.9 °C)] 97.5 °F (36.4 °C)  Heart Rate:  [60-75] 60  Resp:  [18] 18  BP: (138-178)/(74-90) 148/74  Body mass index is 26.18 kg/m².    Physical Exam  Constitutional:       General: He is not in acute distress.     Appearance: He is not toxic-appearing.   HENT:      Head: Normocephalic and atraumatic.   Eyes:      Extraocular Movements: Extraocular movements intact.      Conjunctiva/sclera: Conjunctivae normal.   Cardiovascular:      Rate and Rhythm: Normal rate.      Heart sounds: Normal heart sounds.   Pulmonary:      Effort: Pulmonary effort is normal.      Breath sounds: Normal breath sounds.   Abdominal:      General: Bowel sounds are normal.      Palpations: Abdomen is soft.   Musculoskeletal:      Cervical back: Normal range of motion and neck supple.      Right lower leg: No edema.      Left lower leg: No edema.   Skin:     General: Skin is warm and dry.   Neurological:      General: No focal deficit present.      Mental Status: He is alert and oriented to person, place, and time. Mental status is at baseline.   Psychiatric:         Behavior: Behavior normal.         Thought Content: Thought content normal.         Consultants     Consult Orders (all) (From admission, onward)       Start     Ordered    07/14/25 2338  Notify Stroke Coordinator  Once        Provider:  (Not yet assigned)    07/14/25 2340 07/14/25 2338  Consult to Case Management   Once        Provider:  (Not yet assigned)    07/14/25 2340 07/14/25 2338  Inpatient Diabetes Educator Consult  Once,   Status:  Canceled        Provider:  (Not yet assigned)    07/14/25 2340 07/14/25 2338   Inpatient Neurology Consult Stroke  Once        Specialty:  Neurology  Provider:  Bijan Francis MD    07/14/25 2340 07/14/25 2316  [facility] (on-call MD unless specified) Details  Once        Specialty:  Hospitalist  Provider:  (Not yet assigned)    07/14/25 2315 07/14/25 2025  Inpatient Neurology Consult Stroke  Once,   Status:  Canceled        Specialty:  Neurology  Provider:  (Not yet assigned)    07/14/25 2026 07/14/25 2025  Inpatient Neurology Consult Stroke  Once,   Status:  Canceled        Specialty:  Neurology  Provider:  (Not yet assigned)    07/14/25 2026                  Procedures     * Surgery not found *    Imaging Results (All)       Procedure Component Value Units Date/Time    MRI Brain Without Contrast [[id number]] Collected: 07/15/25 0906     Updated: 07/15/25 0911    Narrative:      MRI OF THE BRAIN WITHOUT CONTRAST ON 07/15/2025     CLINICAL HISTORY: This is a 75-year-old male patient with right-sided  weakness and numbness that has resolved. The patient has a history of  prostate cancer.     TECHNIQUE: Axial T1, FLAIR, fat-suppressed T2, axial diffusion and  gradient echo T2 and sagittal T1-weighted images were obtained of the  entire head.     This is correlated to a contrast-enhanced CT angiogram of the head and  neck yesterday evening on 07/14/2025 at 8:47 p.m.     FINDINGS: On the axial FLAIR and T2-weighted images, there are multiple  tiny patchy nodular foci of T2 high signal extending from the  periventricular into the subcortical white matter consistent with  mild-to-moderate small vessel disease. On the diffusion-weighted image  45 sequence 5, there is an ovoid 6 x 4 mm diffusion hyperintense focus  projecting over the inferior lateral left thalamus that extends into the  medial fibers of the posterior limb of the left internal capsule that is  low signal on the ADC maps and is compatible with a tiny focus of  cytotoxic edema from a tiny acute lacunar-type infarct.  Furthermore, on  the axial diffusion-weighted images, there is a tiny 3 mm diffusion  hyperintense focus in the lateral left occipital juxtacortical white  matter and a very tiny 2-3 mm focus in the posterior left occipital  white matter. It is suspicious for very subtle tiny acute left occipital  infarcts. The lateral left occipital white matter lesion may be in the  left MCA territory although the posterior left occipital lobe tiny acute  infarct may be in the left PCA territory. The remainder of the brain  parenchyma is normal in signal intensity. There is perhaps mild cerebral  atrophy. The ventricles are upper limits of normal in size. I see no  mass effect, no midline shift and no extra-axial fluid collections are  identified. On the gradient echo T2-weighted sequence, no acute or old  blood breakdown products are seen intracranially. Specifically, I see no  evidence of hemosiderin deposition in the brain or the extra-axial  spaces overlying the brain. The calvarium and skull base demonstrate  normal marrow signal intensity. There is mild inferior left maxillary  sinus mucosal thickening. Otherwise, the paranasal sinuses and the  mastoid air cells and the middle ear cavities are clear. The orbits are  normal in appearance. Good flow voids are demonstrated within the  cerebral vessels and in the dural venous sinuses.       Impression:      1. There is mild-to-moderate small vessel disease in the cerebral white  matter.     2. There is a 6 x 4 mm tiny acute infarct extending from the lateral  left thalamus into the medial fibers of the posterior limb of the left  internal capsule that may account for the patient's right-sided  weakness. Furthermore, there are 2 separate tiny 2-3 mm acute infarcts  in the left occipital lobe, one in the lateral left occipital  juxtacortical white matter in the left MCA territory and one in the  posterior left occipital juxtacortical white matter that may be in the  left PCA  territory. The remainder of the MRI of the brain is normal.      The results of this study were communicated to Dr. Cochran from Stroke  Neurology by telephone on 07/15/2025 at 8:15 AM.     This report was finalized on 7/15/2025 9:08 AM by Dr. Kilo Haines M.D  on Workstation: [id number]       CT Angiogram Head w AI Analysis of LVO [[id number]] Collected: 07/14/25 2119     Updated: 07/14/25 2133    Narrative:      HEAD CT WITHOUT CONTRAST, HEAD & NECK CTA WITH CONTRAST     INDICATION:  Right-sided weakness.     TECHNIQUE:  Axial images were acquired from the skull base to vertex without  contrast, including multiplanar reformats, per standard departmental  protocol , followed by CT angiogram of the head and neck with IV  contrast. 3-D postprocessing was performed and reviewed.  Evaluation for  a significant carotid arterial stenosis is based on the NASCET criteria.   Radiation dose reduction techniques were utilized, including automated  exposure control, and exposure modulation based on body size.     COMPARISON:  None available.     FINDINGS:     Head CT: There is no CT evidence of acute intracranial hemorrhage, mass,  or infarct. There is volume loss, but there is no evidence of  hydrocephalus or extra-axial fluid collection.  There are nonspecific  white matter changes, but there is no acute intracranial abnormality.     CTA neck: There is a normal aortic arch branching pattern without  proximal great vessel stenosis. Both vertebral arteries are patent  throughout the neck, and supply the basilar artery.  Both common carotid  arteries are normally patent. There is plaque at both cervical carotid  bifurcations, with 0% stenosis in both internal carotid arteries.     CTA head:  There is symmetric distal intracranial runoff in the  anterior, middle, and posterior cerebral artery territories.  There is  no evidence of intracranial aneurysm, or of high-grade intracranial  flow-limiting stenosis.  There is no  evidence of branch vessel  occlusion, or region or zone of hypoperfusion.  The dural venous sinuses  appear normal.     Extravascular structures: There is no intracranial mass or abnormal  enhancement.   The extracranial and cervical soft tissue structures show  no acute abnormality.  The visualized lung apices show no acute  abnormality.  There is no acute bony abnormality.       Impression:         Negative unenhanced head CT, no evidence of acute intracranial  abnormality. Mild senescent change.     There is plaque at both cervical carotid bifurcations, but 0% stenosis  in both internal carotid arteries. Both vertebral arteries are patent  throughout the neck.     Normal head CT angiogram, no acute intracranial vascular abnormality.     This report was finalized on 7/14/2025 9:30 PM by Dr. Antonio Fuentes M.D on Workstation: [id number]       CT Angiogram Neck [[id number]] Collected: 07/14/25 2119     Updated: 07/14/25 2133    Narrative:      HEAD CT WITHOUT CONTRAST, HEAD & NECK CTA WITH CONTRAST     INDICATION:  Right-sided weakness.     TECHNIQUE:  Axial images were acquired from the skull base to vertex without  contrast, including multiplanar reformats, per standard departmental  protocol , followed by CT angiogram of the head and neck with IV  contrast. 3-D postprocessing was performed and reviewed.  Evaluation for  a significant carotid arterial stenosis is based on the NASCET criteria.   Radiation dose reduction techniques were utilized, including automated  exposure control, and exposure modulation based on body size.     COMPARISON:  None available.     FINDINGS:     Head CT: There is no CT evidence of acute intracranial hemorrhage, mass,  or infarct. There is volume loss, but there is no evidence of  hydrocephalus or extra-axial fluid collection.  There are nonspecific  white matter changes, but there is no acute intracranial abnormality.     CTA neck: There is a normal aortic arch branching pattern  without  proximal great vessel stenosis. Both vertebral arteries are patent  throughout the neck, and supply the basilar artery.  Both common carotid  arteries are normally patent. There is plaque at both cervical carotid  bifurcations, with 0% stenosis in both internal carotid arteries.     CTA head:  There is symmetric distal intracranial runoff in the  anterior, middle, and posterior cerebral artery territories.  There is  no evidence of intracranial aneurysm, or of high-grade intracranial  flow-limiting stenosis.  There is no evidence of branch vessel  occlusion, or region or zone of hypoperfusion.  The dural venous sinuses  appear normal.     Extravascular structures: There is no intracranial mass or abnormal  enhancement.   The extracranial and cervical soft tissue structures show  no acute abnormality.  The visualized lung apices show no acute  abnormality.  There is no acute bony abnormality.       Impression:         Negative unenhanced head CT, no evidence of acute intracranial  abnormality. Mild senescent change.     There is plaque at both cervical carotid bifurcations, but 0% stenosis  in both internal carotid arteries. Both vertebral arteries are patent  throughout the neck.     Normal head CT angiogram, no acute intracranial vascular abnormality.     This report was finalized on 7/14/2025 9:30 PM by Dr. Antonio Fuentes M.D on Workstation: [id number]       XR Chest 1 View [[id number]] Collected: 07/14/25 2111     Updated: 07/14/25 2114    Narrative:      AP CHEST     HISTORY: Acute stroke protocol     COMPARISON: None available     FINDINGS: Lung fields are clear. Heart size within normal limits. No  evidence of pneumothorax. No acute bony abnormality       Impression:      No active disease           This report was finalized on 7/14/2025 9:11 PM by Dr. Adrian Hinojosa M.D on Workstation: [id number]               Results for orders placed during the hospital encounter of 07/14/25    Adult  transthoracic echo complete 07/16/2025 12:15 PM    Interpretation Summary    Left ventricular systolic function is normal. Left ventricular ejection fraction appears to be 61 - 65%.    Left ventricular diastolic function is consistent with (grade I) impaired relaxation.    Normal right ventricular cavity size and systolic function noted.    The left atrial cavity is mildly dilated.    Saline test results are negative.    The aortic valve leaflets are mildly to moderately calcified    There is moderate mitral annular calcification.    Insufficient TR velocity profile to estimate the right ventricular systolic pressure.    Mild dilation of the aortic root is present. Aortic root = 4.2 cm The ascending aorta is mildly dilated at 4.0 cm    There is no evidence of pericardial effusion.    Pertinent Labs     Results from last 7 days   Lab Units 07/16/25  0528 07/15/25  0449 07/14/25  2033   WBC 10*3/mm3 8.33 9.33 7.73   HEMOGLOBIN g/dL 15.4 15.6 15.8   PLATELETS 10*3/mm3 226 254 233     Results from last 7 days   Lab Units 07/16/25  0528 07/15/25  0449 07/14/25  2033   SODIUM mmol/L 137 137 133*   POTASSIUM mmol/L 3.6 3.6 3.3*   CHLORIDE mmol/L 106 106 101   CO2 mmol/L 20.0* 18.3* 20.2*   BUN mg/dL 5.0* 8.0 10.0   CREATININE mg/dL 0.48* 0.62* 0.80   GLUCOSE mg/dL 100* 99 106*   EGFR mL/min/1.73 107.7 99.7 92.3     Results from last 7 days   Lab Units 07/14/25 2033   ALBUMIN g/dL 4.2   BILIRUBIN mg/dL 0.3   ALK PHOS U/L 100   AST (SGOT) U/L 21   ALT (SGPT) U/L 23     Results from last 7 days   Lab Units 07/16/25  0528 07/15/25  0449 07/14/25  2033   CALCIUM mg/dL 8.9 9.5 9.5   ALBUMIN g/dL  --   --  4.2   MAGNESIUM mg/dL 2.0  --   --    PHOSPHORUS mg/dL 3.4  --   --            Results from last 7 days   Lab Units 07/15/25  0449   CHOLESTEROL mg/dL 154   TRIGLYCERIDES mg/dL 130   HDL CHOL mg/dL 50   LDL CHOL mg/dL 81             Test Results Pending at Discharge     Pending Results       Procedure [Order ID] Specimen -  Date/Time    Holter Monitor - 72 Hour Up To 15 Days - In process [[id number]] Resulted: 07/16/25 1342     Updated: 07/16/25 1342    This result has not been signed. Information might be incomplete.                Discharge Details        Discharge Medications        New Medications        Instructions Start Date   amLODIPine 5 MG tablet  Commonly known as: NORVASC   5 mg, Oral, Daily   Start Date: July 17, 2025     aspirin 81 MG chewable tablet   81 mg, Oral, Daily   Start Date: July 17, 2025     pantoprazole 40 MG EC tablet  Commonly known as: PROTONIX   40 mg, Oral, Every Early Morning   Start Date: July 17, 2025     ticagrelor 60 MG tablet tablet  Commonly known as: BRILINTA   60 mg, Oral, 2 Times Daily             Changes to Medications        Instructions Start Date   atorvastatin 80 MG tablet  Commonly known as: LIPITOR  What changed:   medication strength  how much to take   80 mg, Oral, Nightly             Continue These Medications        Instructions Start Date   allopurinol 100 MG tablet  Commonly known as: ZYLOPRIM   100 mg, Daily      lisinopril 20 MG tablet  Commonly known as: PRINIVIL,ZESTRIL   20 mg, Daily               Allergies   Allergen Reactions    Penicillins Unknown - High Severity       Discharge Disposition:  Home or Self Care      Discharge Diet:  Diet Order   Procedures    Diet: Cardiac; Healthy Heart (2-3 Na+); Fluid Consistency: Thin (IDDSI 0)       Discharge Activity:   Activity Instructions       Activity as Tolerated              CODE STATUS:    Code Status and Medical Interventions: CPR (Attempt to Resuscitate); Full   Ordered at: 07/14/25 9437     Code Status (Patient has no pulse and is not breathing):    CPR (Attempt to Resuscitate)     Medical Interventions (Patient has pulse or is breathing):    Full       No future appointments.  Additional Instructions for the Follow-ups that You Need to Schedule       Discharge Follow-up with PCP   As directed       Currently Documented PCP:     Daniel Billy MD    PCP Phone Number:    [phone]     Follow Up Details: Please call to schedule a one week or earliest available follow-up with PCP; BP               Follow-up Information       Daniel Billy MD. Schedule an appointment as soon as possible for a visit in 1 week(s).    Specialty: Family Medicine  Contact information:  [address]  [place] [address]  [phone]               [facility] [place] [facility]. Schedule an appointment as soon as possible for a visit in 1 month(s).    Contact information:  [facility] [address] [address] [phone]             Daniel Billy MD .    Specialty: Family Medicine  Why: Please call to schedule a one week or earliest available follow-up with PCP; BP  Contact information:  [address] [place] [address] [address]  [place] [address]  [phone]               Ryan Cochran MD Follow up.    Specialties: Neurology, Hospitalist, Vascular Neurology  Why: Please schedule follow-up appointment September 2025 was previously discussed  Contact information:  [facility] [place] [address]  [phone]                             Additional Instructions for the Follow-ups that You Need to Schedule       Discharge Follow-up with PCP   As directed       Currently Documented PCP:    Daniel Billy MD    PCP Phone Number:    [phone]     Follow Up Details: Please call to schedule a one week or earliest available follow-up with PCP; BP            Time Spent on Discharge:  Greater than 30 minutes      CLARK Peters  [facility]  07/16/25  14:26 EDT        Electronically signed by Sharla Gonzalez MD at 07/16/25 0695

## 2025-07-21 ENCOUNTER — READMISSION MANAGEMENT (OUTPATIENT)
Dept: CALL CENTER | Facility: HOSPITAL | Age: 75
End: 2025-07-21
Payer: COMMERCIAL

## 2025-07-21 NOTE — OUTREACH NOTE
Stroke Week 1 Survey      Flowsheet Row Responses   [facility] patient discharged from? [place]   Does the patient have one of the following disease processes/diagnoses(primary or secondary)? Stroke   Week 1 attempt successful? Yes   Call start time 1719   Call end time 1725   Discharge diagnosis *Stroke-like symptoms   Meds reviewed with patient/caregiver? Yes   Is the patient having any side effects they believe may be caused by any medication additions or changes? No   Does the patient have all medications ordered at discharge? Yes   Is the patient taking all medications as directed (includes completed medication regime)? Yes   Does the patient have a primary care provider?  Yes   Does the patient have an appointment with their PCP within 7 days of discharge? Yes   Has the patient kept scheduled appointments due by today? N/A   Does the patient require any assistance with activities of daily living such as eating, bathing, dressing, walking, etc.? No   Does the patient have any residual symptoms from stroke/TIA? No   Did the patient receive a copy of their discharge instructions? Yes   Nursing interventions Reviewed instructions with patient   What is the patient's perception of their health status since discharge? Improving   Nursing interventions Nurse provided patient education   Is the patient/caregiver able to teach back the risk factors for a stroke? High blood pressure-goal below 120/80   Is the patient/caregiver able to teach back signs and symptoms related to disease process for when to call PCP? Yes   Is the patient/caregiver able to teach back signs and symptoms related to disease process for when to call 911? Yes   If the patient is a current smoker, are they able to teach back resources for cessation? Not a smoker   Is the patient/caregiver able to teach back the hierarchy of who to call/visit for symptoms/problems? PCP, Specialist, Home health nurse, Urgent Care, ED, 911 Yes   Additional  teach back comments States he is doing better and bp has came down.  Was 134/69 today.  Has f/u with PCP and will make appt with cardiology.  States he thought they told him to f/u with neurology also and will make that appt.   Is the patient able to teach back FAST for Stroke? B alance: Watch for sudden loss of balance, E yes: Check for vision loss, F ace: Look for an uneven smile, A rm: Check if one arm is weak, S peech: Listen for slurred speech, T jeannette: Call 9-1-1 right away   Week 1 call completed? Yes   Graduated/Revoked comments No questions or needs at this time.   Call end time 0302            Tiff HACKETT - Licensed Nurse

## 2025-08-07 LAB
CV ZIO BASELINE AVG BPM: 67 BPM
CV ZIO BASELINE BPM HIGH: 169 BPM
CV ZIO BASELINE BPM LOW: 46 BPM
CV ZIO DEVICE ANALYSIS TIME: NORMAL
CV ZIO ECT SVE COUNT: 510 EPISODES
CV ZIO ECT SVE CPLT COUNT: 35 EPISODES
CV ZIO ECT SVE CPLT FREQ: NORMAL
CV ZIO ECT SVE FREQ: NORMAL
CV ZIO ECT SVE TPLT COUNT: 7 EPISODES
CV ZIO ECT SVE TPLT FREQ: NORMAL
CV ZIO ECT VE COUNT: 1462 EPISODES
CV ZIO ECT VE CPLT COUNT: 93 EPISODES
CV ZIO ECT VE CPLT FREQ: NORMAL
CV ZIO ECT VE FREQ: NORMAL
CV ZIO ECT VE TPLT COUNT: 13 EPISODES
CV ZIO ECT VE TPLT FREQ: NORMAL
CV ZIO ECTOPIC SVE COUPLET RAW PERCENT: 0.01 %
CV ZIO ECTOPIC SVE ISOLATED PERCENT: 0.04 %
CV ZIO ECTOPIC SVE TRIPLET RAW PERCENT: 0 %
CV ZIO ECTOPIC VE COUPLET RAW PERCENT: 0.01 %
CV ZIO ECTOPIC VE ISOLATED PERCENT: 0.11 %
CV ZIO ECTOPIC VE TRIPLET RAW PERCENT: 0 %
CV ZIO ENROLLMENT END: NORMAL
CV ZIO ENROLLMENT START: NORMAL
CV ZIO PATIENT EVENTS DIARIES: 0
CV ZIO PATIENT EVENTS TRIGGERS: 0
CV ZIO PAUSE COUNT: 0
CV ZIO PRESCRIPTION STATUS: NORMAL
CV ZIO SVT AVG BPM: 137 BPM
CV ZIO SVT BPM HIGH: 169 BPM
CV ZIO SVT BPM LOW: 113 BPM
CV ZIO SVT COUNT: 2
CV ZIO SVT F EPI AVG BPM: 148 BPM
CV ZIO SVT F EPI BEATS: 12 BEATS
CV ZIO SVT F EPI BPM HIGH: 169 BPM
CV ZIO SVT F EPI BPM LOW: 114 BPM
CV ZIO SVT F EPI DUR: 4.5 SEC
CV ZIO SVT F EPI END: NORMAL
CV ZIO SVT F EPI START: NORMAL
CV ZIO SVT L EPI AVG BPM: 148 BPM
CV ZIO SVT L EPI BEATS: 12 BEATS
CV ZIO SVT L EPI BPM HIGH: 169 BPM
CV ZIO SVT L EPI BPM LOW: 114 BPM
CV ZIO SVT L EPI DUR: 4.5 SEC
CV ZIO SVT L EPI END: NORMAL
CV ZIO SVT L EPI START: NORMAL
CV ZIO TOTAL  ENROLLMENT PERIOD: NORMAL
CV ZIO VT AVG BPM: 118 BPM
CV ZIO VT BPM HIGH: 150 BPM
CV ZIO VT BPM LOW: 79 BPM
CV ZIO VT COUNT: 1
CV ZIO VT F EPI AVG BPM: 118
CV ZIO VT F EPI BEATS: 4 BEATS
CV ZIO VT F EPI BPM HIGH: 150
CV ZIO VT F EPI BPM LOW: 79
CV ZIO VT F EPI DUR: 2.5 SEC
CV ZIO VT F EPI END: NORMAL
CV ZIO VT F EPI START: NORMAL
CV ZIO VT L EPI AVG BPM: 118
CV ZIO VT L EPI BEATS: 4 BEATS
CV ZIO VT L EPI BPM HIGH: 150 BPM
CV ZIO VT L EPI BPM LOW: 79 BPM
CV ZIO VT L EPI DUR: 2.5
CV ZIO VT L EPI END: NORMAL
CV ZIO VT L EPI START: NORMAL